# Patient Record
Sex: FEMALE | Race: WHITE | NOT HISPANIC OR LATINO | Employment: FULL TIME | URBAN - METROPOLITAN AREA
[De-identification: names, ages, dates, MRNs, and addresses within clinical notes are randomized per-mention and may not be internally consistent; named-entity substitution may affect disease eponyms.]

---

## 2019-12-18 ENCOUNTER — OFFICE VISIT (OUTPATIENT)
Dept: FAMILY MEDICINE | Facility: CLINIC | Age: 24
End: 2019-12-18
Payer: COMMERCIAL

## 2019-12-18 VITALS
HEIGHT: 67 IN | DIASTOLIC BLOOD PRESSURE: 78 MMHG | BODY MASS INDEX: 41.44 KG/M2 | WEIGHT: 264 LBS | RESPIRATION RATE: 16 BRPM | OXYGEN SATURATION: 100 % | TEMPERATURE: 98 F | HEART RATE: 91 BPM | SYSTOLIC BLOOD PRESSURE: 128 MMHG

## 2019-12-18 DIAGNOSIS — F40.243 FLYING PHOBIA: Primary | ICD-10-CM

## 2019-12-18 PROCEDURE — 99202 OFFICE O/P NEW SF 15 MIN: CPT | Performed by: FAMILY MEDICINE

## 2019-12-18 RX ORDER — ALPRAZOLAM 0.25 MG
0.25 TABLET ORAL 3 TIMES DAILY PRN
COMMUNITY
End: 2022-01-04

## 2019-12-18 RX ORDER — LORAZEPAM 0.5 MG/1
TABLET ORAL
Qty: 10 TABLET | Refills: 1 | Status: SHIPPED | OUTPATIENT
Start: 2019-12-18 | End: 2022-01-04

## 2019-12-18 ASSESSMENT — MIFFLIN-ST. JEOR: SCORE: 1967.19

## 2019-12-18 NOTE — PROGRESS NOTES
"Subjective     Melva Torres is a 24 year old female who presents to clinic today for the following health issues:    HPI   New Patient/Transfer of Care  Patient would like to discuss anxiety episodes that she gets only when flying the day before     Recently moved from [facility] on vaccines and pap - DELMI signed    New to [place]  Lots of travel in the  Last few years for work  Has used Xanax in the past helped some  Flying every other week  Has some anxiety outside of flying  Occurs with her cycle  Once a month has debilitating   Sister has possible bipolar        There is no problem list on file for this patient.    History reviewed. No pertinent surgical history.    Social History     Tobacco Use     Smoking status: Never Smoker     Smokeless tobacco: Never Used   Substance Use Topics     Alcohol use: Yes     Comment: occ.      Family History   Problem Relation Age of Onset     Allergies Mother      Eye Disorder Father      Diabetes Maternal Grandfather          Current Outpatient Medications   Medication Sig Dispense Refill     ALPRAZolam (XANAX) 0.25 MG tablet Take 0.25 mg by mouth 3 times daily as needed for anxiety       LORazepam (ATIVAN) 0.5 MG tablet Use 30 min before flights.  This is a 10 week supply 10 tablet 1       Reviewed and updated as needed this visit by Provider  Problems         Review of Systems   ROS COMP: Constitutional, HEENT, cardiovascular, pulmonary, gi and gu systems are negative, except as otherwise noted.      Objective    /78   Pulse 91   Temp 98  F (36.7  C) (Oral)   Resp 16   Ht 1.689 m (5' 6.5\")   Wt 119.7 kg (264 lb)   LMP 12/11/2019   SpO2 100%   BMI 41.97 kg/m    Body mass index is 41.97 kg/m .  Physical Exam   GENERAL: healthy, alert and no distress  PSYCH: mentation appears normal, affect normal/bright    Diagnostic Test Results:  Labs reviewed in Epic        Assessment & Plan     1. Flying phobia  Ok for small supply of meds for flying - she flies about every " "other seek 1 pill each way  If anxiety increases or worsens can use talk therapy or call me to discuss controller meds  - LORazepam (ATIVAN) 0.5 MG tablet; Use 30 min before flights.  This is a 10 week supply  Dispense: 10 tablet; Refill: 1  - MENTAL HEALTH REFERRAL  - Adult; Outpatient Treatment; Individual/Couples/Family/Group Therapy/Health Psychology; G: [facility] [phone]; We will contact you to schedule the appointment or please call with any questions     BMI:   Estimated body mass index is 41.97 kg/m  as calculated from the following:    Height as of this encounter: 1.689 m (5' 6.5\").    Weight as of this encounter: 119.7 kg (264 lb).           See Patient Instructions    No follow-ups on file.    Deidra Salazar MD  [facility]    "

## 2020-02-25 ENCOUNTER — TELEPHONE (OUTPATIENT)
Dept: FAMILY MEDICINE | Facility: CLINIC | Age: 25
End: 2020-02-25

## 2020-02-25 NOTE — TELEPHONE ENCOUNTER
See below MyChart conversation with pt     Patient has had low grade fever for ~1 wk  Has pain also to hip/lower back area  States if you were to put your hands on your hips - the pain is located with thumb would go on (R) side  Told pt worried about appendix  Pt states pain not necessarily where appendix is located    It's very sharp at times  Becoming more frequent  Not connected to eating, using bathroom, etc     No other symptoms (denies nausea, vomiting, dizziness)  Painful for just short periods of time     Couldn't move for a minute today during a meeting d/t severity of pain     Pain feels better when pt pushes on area  Denies pain with palpation    No recent injuries or falls  Told pt to keep appt as scheduled tomorrow but to go to ER if worsening or new s/s  Pt agrees with plan  Thi LE RN

## 2020-02-25 NOTE — TELEPHONE ENCOUNTER
Cater to u Appt from Patient:     ===View-only below this line===      ----- Message -----     From: Melva Brian     Sent: 2/25/2020  2:37 PM CST       To: Patient Appointment Schedule Request Message List  Subject: RE: Appointment scheduled from Cater to u    Hi marilin,     I've had a low fever for a couple days but no other symptoms of anything. Pain started Monday and is infrequent, but today it's gotten slightly worse and more frequent.     Melva Nunez  ----- Message -----  From: Marilin LE  Sent: 2/25/2020  2:33 PM CST  To: Melva Torres  Subject: RE: Appointment scheduled from Cater to u  Hi Melva,     Do you have other symptoms like nausea, vomiting, fever, dizziness, etc?    Marilin LE RN      ----- Message -----     From: Melva Torres     Sent: 2/25/2020  1:50 PM CST       To: Patient Appointment Schedule Request Message List  Subject: RE: Appointment scheduled from Cater to u    Appointment For: Melva Torres ([id number])  Visit Type: [facility] OFFICE VISIT SHORT (910)    2/26/2020    9:45 AM  15 mins.  Deidra Salazar MD [facility]    Patient Comments:  I am experiencing pain on my right side above my hip, it is sharp and inconsistent.

## 2020-02-26 ENCOUNTER — OFFICE VISIT (OUTPATIENT)
Dept: FAMILY MEDICINE | Facility: CLINIC | Age: 25
End: 2020-02-26
Payer: COMMERCIAL

## 2020-02-26 VITALS
WEIGHT: 264.3 LBS | OXYGEN SATURATION: 95 % | SYSTOLIC BLOOD PRESSURE: 130 MMHG | BODY MASS INDEX: 41.48 KG/M2 | DIASTOLIC BLOOD PRESSURE: 82 MMHG | HEIGHT: 67 IN | TEMPERATURE: 98.3 F | HEART RATE: 77 BPM

## 2020-02-26 DIAGNOSIS — M54.50 ACUTE RIGHT-SIDED LOW BACK PAIN WITHOUT SCIATICA: Primary | ICD-10-CM

## 2020-02-26 LAB
ALBUMIN UR-MCNC: NEGATIVE MG/DL
APPEARANCE UR: CLEAR
BILIRUB UR QL STRIP: NEGATIVE
COLOR UR AUTO: YELLOW
GLUCOSE UR STRIP-MCNC: NEGATIVE MG/DL
HGB UR QL STRIP: NEGATIVE
KETONES UR STRIP-MCNC: NEGATIVE MG/DL
LEUKOCYTE ESTERASE UR QL STRIP: ABNORMAL
MUCOUS THREADS #/AREA URNS LPF: PRESENT /LPF
NITRATE UR QL: NEGATIVE
NON-SQ EPI CELLS #/AREA URNS LPF: ABNORMAL /LPF
PH UR STRIP: 6.5 PH (ref 5–7)
RBC #/AREA URNS AUTO: ABNORMAL /HPF
SOURCE: ABNORMAL
SP GR UR STRIP: 1.02 (ref 1–1.03)
UROBILINOGEN UR STRIP-ACNC: 0.2 EU/DL (ref 0.2–1)
WBC #/AREA URNS AUTO: ABNORMAL /HPF

## 2020-02-26 PROCEDURE — 99214 OFFICE O/P EST MOD 30 MIN: CPT | Performed by: FAMILY MEDICINE

## 2020-02-26 PROCEDURE — 81001 URINALYSIS AUTO W/SCOPE: CPT | Performed by: FAMILY MEDICINE

## 2020-02-26 ASSESSMENT — MIFFLIN-ST. JEOR: SCORE: 1968.55

## 2020-02-26 NOTE — NURSING NOTE
"Chief Complaint   Patient presents with     Back Pain     /82   Pulse 77   Temp 98.3  F (36.8  C) (Oral)   Ht 1.689 m (5' 6.5\")   Wt 119.9 kg (264 lb 4.8 oz)   LMP 02/04/2020 (Approximate)   SpO2 95%   BMI 42.02 kg/m   Estimated body mass index is 42.02 kg/m  as calculated from the following:    Height as of this encounter: 1.689 m (5' 6.5\").    Weight as of this encounter: 119.9 kg (264 lb 4.8 oz).  bp completed using cuff size: large      Health Maintenance addressed:  NONE    n/a    Amber Hugo MA    "

## 2020-02-26 NOTE — PROGRESS NOTES
Subjective     Melva Torres is a 24 year old female who presents to clinic today for the following health issues:    HPI   Right side Pain      Duration: 02/24/20    Descripti/on (location/character/radiation): right side back        Associa/nancy flank pain: None    Intensity:  moderate    Accompanying signs and symptoms:        Fever/Chills: YES- low grade fever for 7 days       Gas/Bloating: YES (no pain)       Nausea/vomitting: no        Diarrhea: YES (not too abnormal, more bloated than normal)       Dysuria or Hematuria: no     History (previous similar pain/trauma/previous testing): none    Precipitating or alleviating factors:       Pain worse with eating/BM/urination: none       Pain relieved by BM: no     Therapies tried and outcome: hot rice bag, temporary relief    LMP:  ~2/4/20    Sharp pain in [place] area in R low back coming on/off.  Come on ~3-4 times Mon (2 days ago), 10 times on Tues (yest), usually lasting ~1 min.  Very painful, knife-like.  Goes to grab it, and pressure helps, but still hurts.  Feels like deep pain, like massage wouldn't help.  Usually happens when sitting, and sits there, movement doesn't help/hurt.  ~7/10 pain.    Mild GI sx's as noted above, otherwise feeling well.        There is no problem list on file for this patient.    History reviewed. No pertinent surgical history.    Social History     Tobacco Use     Smoking status: Never Smoker     Smokeless tobacco: Never Used   Substance Use Topics     Alcohol use: Yes     Comment: occ.      Family History   Problem Relation Age of Onset     Allergies Mother      Eye Disorder Father      Diabetes Maternal Grandfather          Current Outpatient Medications   Medication Sig Dispense Refill     ALPRAZolam (XANAX) 0.25 MG tablet Take 0.25 mg by mouth 3 times daily as needed for anxiety       LORazepam (ATIVAN) 0.5 MG tablet Use 30 min before flights.  This is a 10 week supply 10 tablet 1     No Known Allergies    Reviewed and updated  "as needed this visit by Provider  Tobacco  Allergies  Meds  Problems  Med Hx  Surg Hx  Fam Hx         Review of Systems   ROS COMP: CONSTITUTIONAL:POSITIVE  for fever just at night the last week of so and NEGATIVE  for chills, malaise and sweats  INTEGUMENTARY/SKIN: NEGATIVE for worrisome rashes, moles or lesions  EYES: NEGATIVE for vision changes or irritation  ENT/MOUTH: NEGATIVE for ear, mouth and throat problems  RESP: NEGATIVE for significant cough or SOB  BREAST: NEGATIVE for masses, tenderness or discharge  CV: NEGATIVE for chest pain, palpitations or peripheral edema  GI: NEGATIVE for nausea, abdominal pain or constipation, POSITIVE for bloating and diarrhea after eating pizza and beer over the weekend  : NEGATIVE for frequency, dysuria, hematuria or cloudy urine  MUSCULOSKELETAL: NEGATIVE for significant arthralgias or myalgia  MUSCULOSKELETAL:POSITIVE  for right lower back pain and NEGATIVE for arthralgias, myalgia and paresthesias  NEURO: NEGATIVE for weakness, dizziness or paresthesias  ENDOCRINE: NEGATIVE for temperature intolerance, skin/hair changes  HEME: NEGATIVE for bleeding problems  PSYCHIATRIC: NEGATIVE for changes in mood or affect      Objective    /82   Pulse 77   Temp 98.3  F (36.8  C) (Oral)   Ht 1.689 m (5' 6.5\")   Wt 119.9 kg (264 lb 4.8 oz)   LMP 02/04/2020 (Approximate)   SpO2 95%   BMI 42.02 kg/m    Body mass index is 42.02 kg/m .  Physical Exam   GENERAL: healthy, alert and no distress  EYES: Eyes grossly normal to inspection, conjunctivae and sclerae normal  HENT: ear canals and TM's normal, nose and mouth without ulcers or lesions  NECK: no adenopathy, no asymmetry, masses, or scars   RESP: lungs clear to auscultation - no rales, rhonchi or wheezes  CV: regular rate and rhythm, normal S1 S2, no S3 or S4, no murmur, click or rub  ABDOMEN: tenderness RLQ and R lowback pain on palpation, no organomegaly or masses and bowel sounds normal  MS: no gross " musculoskeletal defects noted, no edema  SKIN: no suspicious lesions or rashes  NEURO: Normal strength and tone, mentation intact and speech normal  PSYCH: mentation appears normal, affect normal/bright    Diagnostic Test Results:  Urinalysis - negative        Assessment & Plan       ICD-10-CM    1. Acute right-sided low back pain without sciatica M54.5 UA with Microscopic reflex to Culture     Unsure etiology.  UA normal, so unlikely pyelonephritis, UTI or kidney stones.  Rec use heat and ice and stretching  Monitor and RTC if not going away or worsening.      Socorro Barragan Nurse Practitioner Student  The nurse practitioner student has acted as my scribe.  I have completed all components of the history, physical exam and assessment and plan and agree with the note as documented.      Luz Maria Chiang MD  [facility]

## 2020-03-11 ENCOUNTER — HEALTH MAINTENANCE LETTER (OUTPATIENT)
Age: 25
End: 2020-03-11

## 2021-01-04 ENCOUNTER — HEALTH MAINTENANCE LETTER (OUTPATIENT)
Age: 26
End: 2021-01-04

## 2021-02-04 ENCOUNTER — TELEPHONE (OUTPATIENT)
Dept: FAMILY MEDICINE | Facility: CLINIC | Age: 26
End: 2021-02-04

## 2021-02-04 NOTE — TELEPHONE ENCOUNTER
Patient Quality Outreach      Summary:    Patient has the following on her problem list/HM: None    Patient is due/failing the following:   Cervical Cancer Screening - PAP Needed and Adult/Adolescent physical, date due: 1994    Type of outreach:    Sent Intalio message.    Questions for provider review:    None                                                                                                                                     Aye Quinonez MA on 2/4/2021 at 11:15 AM       Chart routed to .

## 2021-03-02 ENCOUNTER — OFFICE VISIT (OUTPATIENT)
Dept: FAMILY MEDICINE | Facility: CLINIC | Age: 26
End: 2021-03-02
Payer: COMMERCIAL

## 2021-03-02 VITALS
DIASTOLIC BLOOD PRESSURE: 85 MMHG | SYSTOLIC BLOOD PRESSURE: 126 MMHG | WEIGHT: 264.8 LBS | BODY MASS INDEX: 42.56 KG/M2 | HEART RATE: 101 BPM | HEIGHT: 66 IN | OXYGEN SATURATION: 95 %

## 2021-03-02 DIAGNOSIS — N94.10 DYSPAREUNIA IN FEMALE: ICD-10-CM

## 2021-03-02 DIAGNOSIS — Z00.00 ROUTINE GENERAL MEDICAL EXAMINATION AT A HEALTH CARE FACILITY: Primary | ICD-10-CM

## 2021-03-02 LAB
SPECIMEN SOURCE: NORMAL
WET PREP SPEC: NORMAL

## 2021-03-02 PROCEDURE — 87210 SMEAR WET MOUNT SALINE/INK: CPT | Performed by: FAMILY MEDICINE

## 2021-03-02 PROCEDURE — 87491 CHLMYD TRACH DNA AMP PROBE: CPT | Performed by: FAMILY MEDICINE

## 2021-03-02 PROCEDURE — G0145 SCR C/V CYTO,THINLAYER,RESCR: HCPCS | Performed by: FAMILY MEDICINE

## 2021-03-02 PROCEDURE — 87591 N.GONORRHOEAE DNA AMP PROB: CPT | Performed by: FAMILY MEDICINE

## 2021-03-02 PROCEDURE — 99395 PREV VISIT EST AGE 18-39: CPT | Performed by: FAMILY MEDICINE

## 2021-03-02 SDOH — ECONOMIC STABILITY: INCOME INSECURITY: HOW HARD IS IT FOR YOU TO PAY FOR THE VERY BASICS LIKE FOOD, HOUSING, MEDICAL CARE, AND HEATING?: NOT ASKED

## 2021-03-02 SDOH — ECONOMIC STABILITY: FOOD INSECURITY: WITHIN THE PAST 12 MONTHS, THE FOOD YOU BOUGHT JUST DIDN'T LAST AND YOU DIDN'T HAVE MONEY TO GET MORE.: NOT ASKED

## 2021-03-02 SDOH — ECONOMIC STABILITY: FOOD INSECURITY: WITHIN THE PAST 12 MONTHS, YOU WORRIED THAT YOUR FOOD WOULD RUN OUT BEFORE YOU GOT MONEY TO BUY MORE.: NOT ASKED

## 2021-03-02 SDOH — ECONOMIC STABILITY: TRANSPORTATION INSECURITY
IN THE PAST 12 MONTHS, HAS THE LACK OF TRANSPORTATION KEPT YOU FROM MEDICAL APPOINTMENTS OR FROM GETTING MEDICATIONS?: NOT ASKED

## 2021-03-02 SDOH — ECONOMIC STABILITY: TRANSPORTATION INSECURITY
IN THE PAST 12 MONTHS, HAS LACK OF TRANSPORTATION KEPT YOU FROM MEETINGS, WORK, OR FROM GETTING THINGS NEEDED FOR DAILY LIVING?: NOT ASKED

## 2021-03-02 ASSESSMENT — MIFFLIN-ST. JEOR: SCORE: 1961.84

## 2021-03-02 NOTE — PROGRESS NOTES
SUBJECTIVE:   CC: Melva Torres is an 25 year old woman who presents for preventive health visit.       Patient has been advised of split billing requirements and indicates understanding: Yes  Healthy Habits:     Getting at least 3 servings of Calcium per day:  Yes    Bi-annual eye exam:  Yes    Dental care twice a year:  Yes    Sleep apnea or symptoms of sleep apnea:  None    Diet:  Regular (no restrictions)    Frequency of exercise:  2-3 days/week    Duration of exercise:  15-30 minutes    Taking medications regularly:  Not Applicable    Medication side effects:  Not applicable    PHQ-2 Total Score: 0    Additional concerns today:  Yes    Major issue is pain with sex.  Was fine, but started worsening ~3 yrs ago and now really not able to have penetrative intercourse.  Same partner (engaged) x 5 yrs- first couple years she had no symptoms with sex.  Gradually worsened over the last few yrs.  Now, he can't penetrate even 1-2 inches, hurts her too much.  He's very supportive, and they have tried a lot of different things to help.  Good relationship.  They'd like to have kids together, so wanted to try and address the issue.  Wanted to rule out potential issues.  Sx's- Every time with intercourse, gets 'intense stinging, hot poker feeling'.  Pain in areas of walls, inside opening and whole vaginal area.  Not deep pain.  Just feels like the whole vaginal area is too small.  She's tried tampons- mildly uncomfortable, but okay.  Tried dildo, ~1in, hurts as well.  Uses lubrication, foreplay, trying to relax.    Did pelvic ultrasound ~3 yrs ago in [place].  Found cysts (after painful period).  Normal otherwise.    Vaccines-   Would do flu shot today.  Thinks she did the HPV vaccine- 2 of 3 doses.  Thinks she's had the tdap in the last five years- will check.      Today's PHQ-2 Score:   PHQ-2 ( 1999 Pfizer) 3/2/2021   Q1: Little interest or pleasure in doing things 0   Q2: Feeling down, depressed or hopeless 0   PHQ-2  Score 0   Q1: Little interest or pleasure in doing things Not at all   Q2: Feeling down, depressed or hopeless Not at all   PHQ-2 Score 0       Abuse: Current or Past (Physical, Sexual or Emotional) - No  Do you feel safe in your environment? Yes    Have you ever done Advance Care Planning? (For example, a Health Directive, POLST, or a discussion with a medical provider or your loved ones about your wishes): No, advance care planning information given to patient to review.  Patient declined advance care planning discussion at this time.    Social History     Tobacco Use     Smoking status: Never Smoker     Smokeless tobacco: Never Used   Substance Use Topics     Alcohol use: Yes     Comment: occ.      If you drink alcohol do you typically have >3 drinks per day or >7 drinks per week? No    Alcohol Use 3/2/2021   Prescreen: >3 drinks/day or >7 drinks/week? No   No flowsheet data found.    Any new diagnosis of family breast, ovarian, or bowel cancer? No     Reviewed orders with patient.  Reviewed health maintenance and updated orders accordingly - Yes      Lab work is in process  Labs reviewed in EPIC  BP Readings from Last 3 Encounters:   03/02/21 126/85   02/26/20 130/82   12/18/19 128/78    Wt Readings from Last 3 Encounters:   03/02/21 120.1 kg (264 lb 12.8 oz)   02/26/20 119.9 kg (264 lb 4.8 oz)   12/18/19 119.7 kg (264 lb)                  There is no problem list on file for this patient.    History reviewed. No pertinent surgical history.    Social History     Tobacco Use     Smoking status: Never Smoker     Smokeless tobacco: Never Used   Substance Use Topics     Alcohol use: Yes     Comment: occ.      Family History   Problem Relation Age of Onset     Allergies Mother      Eye Disorder Father      Diabetes Maternal Grandfather          Current Outpatient Medications   Medication Sig Dispense Refill     ALPRAZolam (XANAX) 0.25 MG tablet Take 0.25 mg by mouth 3 times daily as needed for anxiety       LORazepam  "(ATIVAN) 0.5 MG tablet Use 30 min before flights.  This is a 10 week supply (Patient not taking: Reported on 3/2/2021) 10 tablet 1     No Known Allergies  No lab results found.     Breast CA Risk Screening:    Patient under 40 years of age: Routine Mammogram Screening not recommended.   Pertinent mammograms are reviewed under the imaging tab.    History of abnormal Pap smear: NO - age 21-29 PAP every 3 years recommended     Reviewed and updated as needed this visit by clinical staff  Tobacco  Allergies  Meds  Problems  Med Hx  Surg Hx  Fam Hx          Reviewed and updated as needed this visit by Provider                    Review of Systems  10 point ROS of systems including Constitutional, Eyes, Respiratory, Cardiovascular, Gastroenterology, Genitourinary, Integumentary, Muscularskeletal, Psychiatric were all negative except for pertinent positives noted in my HPI.       OBJECTIVE:   /85   Pulse 101   Ht 1.683 m (5' 6.25\")   Wt 120.1 kg (264 lb 12.8 oz)   LMP 02/02/2021 (Exact Date)   SpO2 95%   Breastfeeding No   BMI 42.42 kg/m    Physical Exam  GENERAL: healthy, alert and no distress  EYES: Eyes grossly normal to inspection, PERRL and conjunctivae and sclerae normal  HENT: ear canals and TM's normal, nose and mouth without ulcers or lesions  NECK: no adenopathy, no asymmetry, masses, or scars and thyroid normal to palpation  RESP: lungs clear to auscultation - no rales, rhonchi or wheezes  BREAST: normal without masses, tenderness or nipple discharge and no palpable axillary masses or adenopathy  CV: regular rate and rhythm, normal S1 S2, no S3 or S4, no murmur, click or rub, no peripheral edema and peripheral pulses strong  ABDOMEN: soft, nontender, no hepatosplenomegaly, no masses and bowel sounds normal   (female): normal female external genitalia, normal urethral meatus, vaginal mucosa pink, moist, well rugated, and normal cervix/adnexa/uterus without masses or discharge  MS: no gross " musculoskeletal defects noted, no edema  SKIN: no suspicious lesions or rashes  NEURO: Normal strength and tone, mentation intact and speech normal  PSYCH: mentation appears normal, affect normal/bright    Diagnostic Test Results:  Labs reviewed in Epic  Results for orders placed or performed in visit on 03/02/21   NEISSERIA GONORRHOEA PCR     Status: None    Specimen: Vagina   Result Value Ref Range    Specimen Descrip Vagina     N Gonorrhea PCR Negative NEG^Negative   CHLAMYDIA TRACHOMATIS PCR     Status: None    Specimen: Vagina   Result Value Ref Range    Specimen Description Vagina     Chlamydia Trachomatis PCR Negative NEG^Negative   Wet prep     Status: None    Specimen: Vagina   Result Value Ref Range    Specimen Description Vagina     Wet Prep No Trichomonas seen     Wet Prep No yeast seen     Wet Prep No clue cells seen     Wet Prep WBC'S seen  Few          ASSESSMENT/PLAN:       ICD-10-CM    1. Routine general medical examination at a health care facility  Z00.00 Pap imaged thin layer screen reflex to HPV if ASCUS - recommend age 25 - 29     [facility], HAND, AND CHIROPRACTIC REFERRAL     CANCELED: INFLUENZA VACCINE IM > 6 MONTHS VALENT IIV4 [76848]   2. Dyspareunia in female  N94.10 NEISSERIA GONORRHOEA PCR     CHLAMYDIA TRACHOMATIS PCR     Wet prep     CPE- Discussed diet, calcium and exercise.  Thin prep pap was done.  Eye and dental care UTD or recommended f/u.  Flu vaccine immunizations needed today, but pt had to leave prior to getting vaccine.  See orders below for tests ordered and screening needed.      Dyspareunia- Same partner x 5 yrs, started having pain with intercourse (insertional, stretching/friction-like pain) ~3yrs ago, and continued to worsen, now not able to have any insertional intercourse.  Pain with dilator, minimal pain with tampon.  Speculum exam with regular speculum tolerated but mildly painful.    Wet prep negative. GC/Chlam pending, pap pending.  Will send on to PT for pelvic  "floor/dyspareunia eval/txt.  If not helping, rec referring on to ob/gyn for their thoughts.      Patient has been advised of split billing requirements and indicates understanding: Yes     COUNSELING:  Reviewed preventive health counseling, as reflected in patient instructions    Estimated body mass index is 42.02 kg/m  as calculated from the following:    Height as of 2/26/20: 1.689 m (5' 6.5\").    Weight as of 2/26/20: 119.9 kg (264 lb 4.8 oz).    Weight management plan: Discussed healthy diet and exercise guidelines    She reports that she has never smoked. She has never used smokeless tobacco.      Counseling Resources:  ATP IV Guidelines  Pooled Cohorts Equation Calculator  Breast Cancer Risk Calculator  BRCA-Related Cancer Risk Assessment: FHS-7 Tool  FRAX Risk Assessment  ICSI Preventive Guidelines  Dietary Guidelines for Americans, 2010  USDA's MyPlate  ASA Prophylaxis  Lung CA Screening    Luz Maria Chiang MD  [facility]  "

## 2021-03-03 LAB
C TRACH DNA SPEC QL NAA+PROBE: NEGATIVE
N GONORRHOEA DNA SPEC QL NAA+PROBE: NEGATIVE
SPECIMEN SOURCE: NORMAL
SPECIMEN SOURCE: NORMAL

## 2021-03-04 NOTE — RESULT ENCOUNTER NOTE
Here are your lab results from your recent visit...  -Your STD labs (gonorrhea, chlamydia) were both negative.  -Your wet prep was negative for trichomonas, yeast and bacterial vaginosis (clue cells).    Please let me know if you have any questions.  Best,   Yoni Chiang MD

## 2021-03-05 LAB
COPATH REPORT: NORMAL
PAP: NORMAL

## 2021-03-26 ENCOUNTER — THERAPY VISIT (OUTPATIENT)
Dept: PHYSICAL THERAPY | Facility: CLINIC | Age: 26
End: 2021-03-26
Attending: FAMILY MEDICINE
Payer: COMMERCIAL

## 2021-03-26 DIAGNOSIS — Z00.00 ROUTINE GENERAL MEDICAL EXAMINATION AT A HEALTH CARE FACILITY: ICD-10-CM

## 2021-03-26 DIAGNOSIS — M99.05 SOMATIC DYSFUNCTION OF PELVIC REGION: Primary | ICD-10-CM

## 2021-03-26 PROCEDURE — 97530 THERAPEUTIC ACTIVITIES: CPT | Mod: GP | Performed by: PHYSICAL THERAPIST

## 2021-03-26 PROCEDURE — 97161 PT EVAL LOW COMPLEX 20 MIN: CPT | Mod: GP | Performed by: PHYSICAL THERAPIST

## 2021-03-26 PROCEDURE — 97110 THERAPEUTIC EXERCISES: CPT | Mod: GP | Performed by: PHYSICAL THERAPIST

## 2021-03-26 PROCEDURE — 97112 NEUROMUSCULAR REEDUCATION: CPT | Mod: GP | Performed by: PHYSICAL THERAPIST

## 2021-03-26 NOTE — PROGRESS NOTES
Physical Therapy Initial Evaluation  Subjective:  The history is provided by the patient. No  was used.   Therapist Generated HPI Evaluation  Problem details: HPI: 3-4 years ago onset of painful intercourse. Has supportive partner, but pain has gotten worse. Up to 9/10, has gotten progressively worse. Does not feel stress is a factor    Birth History: no pregnancies    Bladder symptoms:  denies  Fluid intake: 8 cups water, 2 cups coffee    Bowel symptoms  Leaking: denies  Constipation: denies  Regular? 3x/day    [place]:  Difficulty with speculum, able to tolerate tampon, finger, no concerns with external stimulation    Prolapse symptoms: denies    Pain: used to have painful periods, did find cysts, not on meds but periods are no longer painful    PMH/PSH: ovarian cysts, no medications    Exercise: previously enjoyed HIIT, spin class, benji; walking/hiking, yoga    Occupation: , lots of sitting    Goals: decrease pain, able to have children.         Type of problem:  Pelvic dysfunction (dyspareunia).      Condition occurred with:  Insidious onset.  Where condition occurred: for unknown reasons.  Patient reports pain:  Vaginal and vulvar vestibule.  Pain is described as burning and is intermittent.    Since onset symptoms are gradually worsening.  Symptoms are exacerbated by intercourse        Restrictions due to condition include:  Working in normal job without restrictions.  Home/work barriers: intercourse-wanting to have kids.                        Objective:  System                                 Pelvic Dysfunction Evaluation:            Pelvic Clock Exam:    Ischiocavernosis pain:  ++  Bulbocavernosis pain:  ++  Transverse Perineal:  ++          External Assessment:    Skin Condition:  Normal    Bearing Down/Coughing:  Normal      Muscle Contraction/Perineal Mobility:  Elevation and urogential triangle descent  Internal Assessment:    Sensory Exam:   Normal  Contraction/Grade:  Weak squeeze, 2 second hold (2)          SEMG Biofeedback:        Suraface electrode placement--Perianal:  B Levator ani  Baseline EMG PM:  2mV        EMG interpretation to fatigue:  3-5 seconds  Position:  SupineAdditional History:    Number of Pregnancies: 0    Caffeine Consumption:  2 cups/day                     General     ROS    Assessment/Plan:    Patient is a 26 year old female with pelvic complaints.    Patient has the following significant findings with corresponding treatment plan.                Diagnosis 1:  Somatic dysfunction of pelvis  Pain -  manual therapy, self management, education and home program  Impaired muscle performance - neuro re-education and home program  Decreased function - therapeutic activities and home program    Therapy Evaluation Codes:   1) History comprised of:   Personal factors that impact the plan of care:      None.    Comorbidity factors that impact the plan of care are:      Overweight.     Medications impacting care: None.  2) Examination of Body Systems comprised of:   Body structures and functions that impact the plan of care:      Pelvis.   Activity limitations that impact the plan of care are:      Pelvic Exam and [place].  3) Clinical presentation characteristics are:   Stable/Uncomplicated.  4) Decision-Making    Low complexity using standardized patient assessment instrument and/or measureable assessment of functional outcome.  Cumulative Therapy Evaluation is: Low complexity.    Previous and current functional limitations:  (See Goal Flow Sheet for this information)    Short term and Long term goals: (See Goal Flow Sheet for this information)     Communication ability:  Patient appears to be able to clearly communicate and understand verbal and written communication and follow directions correctly.  Treatment Explanation - The following has been discussed with the patient:   RX ordered/plan of care  Anticipated outcomes  Possible  risks and side effects  This patient would benefit from PT intervention to resume normal activities.   Rehab potential is good.    Frequency:  2 X a month, once daily  Duration:  for 3 months  Discharge Plan:  Achieve all LTG.  Independent in home treatment program.  Reach maximal therapeutic benefit.      Please refer to the daily flowsheet for treatment today, total treatment time and time spent performing 1:1 timed codes.

## 2021-03-29 NOTE — PROGRESS NOTES
Physical Therapy Initial Evaluation  Subjective:    Patient Health History           General health as reported by patient is excellent.  Pertinent medical history includes: overweight.   Red flags:  None as reported by patient.  Medical allergies: none.   Surgeries include:  None.    Current medications:  None.    Current occupation is .   Primary job tasks include:  Computer work and prolonged sitting.                                    Objective:  System    Physical Exam    General     ROS    Assessment/Plan:

## 2021-04-02 ENCOUNTER — THERAPY VISIT (OUTPATIENT)
Dept: PHYSICAL THERAPY | Facility: CLINIC | Age: 26
End: 2021-04-02
Payer: COMMERCIAL

## 2021-04-02 DIAGNOSIS — M99.05 SOMATIC DYSFUNCTION OF PELVIC REGION: Primary | ICD-10-CM

## 2021-04-02 PROCEDURE — 97530 THERAPEUTIC ACTIVITIES: CPT | Mod: GP | Performed by: PHYSICAL THERAPIST

## 2021-04-02 PROCEDURE — 97140 MANUAL THERAPY 1/> REGIONS: CPT | Mod: GP | Performed by: PHYSICAL THERAPIST

## 2021-05-03 ENCOUNTER — THERAPY VISIT (OUTPATIENT)
Dept: PHYSICAL THERAPY | Facility: CLINIC | Age: 26
End: 2021-05-03
Payer: COMMERCIAL

## 2021-05-03 DIAGNOSIS — M99.05 SOMATIC DYSFUNCTION OF PELVIC REGION: Primary | ICD-10-CM

## 2021-05-03 PROCEDURE — 97110 THERAPEUTIC EXERCISES: CPT | Mod: GP | Performed by: PHYSICAL THERAPIST

## 2021-05-03 PROCEDURE — 97140 MANUAL THERAPY 1/> REGIONS: CPT | Mod: GP | Performed by: PHYSICAL THERAPIST

## 2021-05-03 NOTE — PROGRESS NOTES
Subjective:  HPI  Physical Exam                    Objective:  System    Physical Exam    General     ROS    Assessment/Plan:    DISCHARGE REPORT    Progress reporting period is from 4/2/21 to 5/3/21.       SUBJECTIVE  Subjective changes noted by patient:  .  Subjective: Using dilators, penetrative intercourse went well. Pt feels good about progress and is comfortable discharging today, as she feels she has the tools to progress on her own    Current pain level is 4/10 Current Pain level: 4/10(with intercourse).     Previous pain level was  9/10  .   Changes in function:  Yes (See Goal flowsheet attached for changes in current functional level)  Adverse reaction to treatment or activity: None    OBJECTIVE  Changes noted in objective findings:  Yes,   Objective: informed consent and no adverse response to MT. Issued handout for intercourse positions. Discussed adding in PFM strengthening with an emphasis on relaxation after strengthening.     ASSESSMENT/PLAN  Updated problem list and treatment plan: Diagnosis 1:  Somatic dysfunction of pelvis region Pain -  manual therapy, self management, education and home program  Impaired muscle performance - neuro re-education and home program  Decreased function - therapeutic activities and home program  STG/LTGs have been met or progress has been made towards goals:  Yes (See Goal flow sheet completed today.)  Assessment of Progress: The patient's condition is improving.  Self Management Plans:  Patient is independent in a home treatment program.  I have re-evaluated this patient and find that the nature, scope, duration and intensity of the therapy is appropriate for the medical condition of the patient.  Melva continues to require the following intervention to meet STG and LTG's:  PT intervention is no longer required to meet STG/LTG.    Recommendations:  This patient is ready to be discharged from therapy and continue their home treatment program.    Please refer to the  daily flowsheet for treatment today, total treatment time and time spent performing 1:1 timed codes.

## 2021-08-28 ASSESSMENT — ANXIETY QUESTIONNAIRES
5. BEING SO RESTLESS THAT IT IS HARD TO SIT STILL: SEVERAL DAYS
3. WORRYING TOO MUCH ABOUT DIFFERENT THINGS: SEVERAL DAYS
6. BECOMING EASILY ANNOYED OR IRRITABLE: NEARLY EVERY DAY
7. FEELING AFRAID AS IF SOMETHING AWFUL MIGHT HAPPEN: SEVERAL DAYS
8. IF YOU CHECKED OFF ANY PROBLEMS, HOW DIFFICULT HAVE THESE MADE IT FOR YOU TO DO YOUR WORK, TAKE CARE OF THINGS AT HOME, OR GET ALONG WITH OTHER PEOPLE?: SOMEWHAT DIFFICULT
GAD7 TOTAL SCORE: 9
7. FEELING AFRAID AS IF SOMETHING AWFUL MIGHT HAPPEN: SEVERAL DAYS
4. TROUBLE RELAXING: SEVERAL DAYS
1. FEELING NERVOUS, ANXIOUS, OR ON EDGE: SEVERAL DAYS
2. NOT BEING ABLE TO STOP OR CONTROL WORRYING: SEVERAL DAYS
GAD7 TOTAL SCORE: 9
GAD7 TOTAL SCORE: 9

## 2021-08-28 ASSESSMENT — PATIENT HEALTH QUESTIONNAIRE - PHQ9
SUM OF ALL RESPONSES TO PHQ QUESTIONS 1-9: 9
10. IF YOU CHECKED OFF ANY PROBLEMS, HOW DIFFICULT HAVE THESE PROBLEMS MADE IT FOR YOU TO DO YOUR WORK, TAKE CARE OF THINGS AT HOME, OR GET ALONG WITH OTHER PEOPLE: SOMEWHAT DIFFICULT
SUM OF ALL RESPONSES TO PHQ QUESTIONS 1-9: 9

## 2021-08-29 ASSESSMENT — ANXIETY QUESTIONNAIRES: GAD7 TOTAL SCORE: 9

## 2021-08-29 ASSESSMENT — PATIENT HEALTH QUESTIONNAIRE - PHQ9: SUM OF ALL RESPONSES TO PHQ QUESTIONS 1-9: 9

## 2021-08-30 ENCOUNTER — HOSPITAL ENCOUNTER (OUTPATIENT)
Dept: BEHAVIORAL HEALTH | Facility: CLINIC | Age: 26
End: 2021-08-30
Attending: FAMILY MEDICINE
Payer: COMMERCIAL

## 2021-08-30 PROCEDURE — 999N000216 HC STATISTIC ADULT CD FACE TO FACE-NO CHRG: Mod: 95 | Performed by: COUNSELOR

## 2021-08-30 ASSESSMENT — COLUMBIA-SUICIDE SEVERITY RATING SCALE - C-SSRS
ATTEMPT LIFETIME: NO
6. HAVE YOU EVER DONE ANYTHING, STARTED TO DO ANYTHING, OR PREPARED TO DO ANYTHING TO END YOUR LIFE?: NO
2. HAVE YOU ACTUALLY HAD ANY THOUGHTS OF KILLING YOURSELF LIFETIME?: NO
3. HAVE YOU BEEN THINKING ABOUT HOW YOU MIGHT KILL YOURSELF?: NO
TOTAL  NUMBER OF INTERRUPTED ATTEMPTS PAST 3 MONTHS: NO
2. HAVE YOU ACTUALLY HAD ANY THOUGHTS OF KILLING YOURSELF?: NO
1. IN THE PAST MONTH, HAVE YOU WISHED YOU WERE DEAD OR WISHED YOU COULD GO TO SLEEP AND NOT WAKE UP?: NO
TOTAL  NUMBER OF INTERRUPTED ATTEMPTS LIFETIME: NO
4. HAVE YOU HAD THESE THOUGHTS AND HAD SOME INTENTION OF ACTING ON THEM?: NO
6. HAVE YOU EVER DONE ANYTHING, STARTED TO DO ANYTHING, OR PREPARED TO DO ANYTHING TO END YOUR LIFE?: NO
ATTEMPT PAST THREE MONTHS: NO
5. HAVE YOU STARTED TO WORK OUT OR WORKED OUT THE DETAILS OF HOW TO KILL YOURSELF? DO YOU INTEND TO CARRY OUT THIS PLAN?: NO
4. HAVE YOU HAD THESE THOUGHTS AND HAD SOME INTENTION OF ACTING ON THEM?: NO
TOTAL  NUMBER OF ABORTED OR SELF INTERRUPTED ATTEMPTS PAST LIFETIME: NO
TOTAL  NUMBER OF ABORTED OR SELF INTERRUPTED ATTEMPTS PAST 3 MONTHS: NO
5. HAVE YOU STARTED TO WORK OUT OR WORKED OUT THE DETAILS OF HOW TO KILL YOURSELF? DO YOU INTEND TO CARRY OUT THIS PLAN?: NO
1. IN THE PAST MONTH, HAVE YOU WISHED YOU WERE DEAD OR WISHED YOU COULD GO TO SLEEP AND NOT WAKE UP?: NO

## 2021-08-30 NOTE — PROGRESS NOTES
"[facility]    ADULT Mental Health Assessment Appointment Note    PATIENT'S NAME:  Melva Torres    MRN: [id number]  YOB: 1995  Address:  [address] [place] [address]  PREFERRED PHONE: [phone]  May we leave a referral related message: Yes    DATE OF SERVICE: 21  START TIME: 800  END TIME: 830  SERVICE MODALITY:  Video Visit:      Provider verified identity through the following two step process.  Patient provided:  Patient  and Patient address    Telemedicine Visit: The patient's condition can be safely assessed and treated via synchronous audio and visual telemedicine encounter.      Reason for Telemedicine Visit: Services only offered telehealth    Originating Site (Patient Location): Patient's home    Distant Site (Provider Location): Provider Remote Setting- Home Office    Consent:  The patient/guardian has verbally consented to: the potential risks and benefits of telemedicine (video visit) versus in person care; bill my insurance or make self-payment for services provided; and responsibility for payment of non-covered services.     Patient would like the video invitation sent by:  My Chart    Mode of Communication:  Video Conference via [employer]    As the provider I attest to compliance with applicable laws and regulations related to telemedicine.    Identifying Information:  Patient is a 26 year old, .  Patient was referred for an assessment by self.  Patient attended the session alone. Patient identified their preferred language to be English. Patient reported they does not need the assistance of an  or other support involved in therapy.     Services Requested:   Chief Complaint:   The reason for seeking services at this time is: \"I would like medication or something just to help me cope with the extra bad days. Ideally id like to stop crying at work.\".    The problem(s) began 21.Patient has not " attempted to resolve these concerns in the past.  Patient does not have legal concerns.      Mental Health:  Review of Symptoms per patient report:  Depression: No symptoms, Lack of interest, Change in energy level, Change in appetite, Ruminations, Irritability, Feeling sad, down, or depressed, Frequent crying and Anger outbursts  Mary:  No Symptoms  Psychosis: No Symptoms  Anxiety: Excessive worry, Nervousness, Physical complaints, such as headaches, stomachaches, muscle tension, Ruminations, Irritability and Anger outbursts  Panic:  Palpitations, Shortness of breath and Sense of impending doom     Post Traumatic Stress Disorder:  No Symptoms   Eating Disorder: No Symptoms  ADD / ADHD:  No symptoms  Conduct Disorder: No symptoms  Autism Spectrum Disorder: No symptoms  Obsessive Compulsive Disorder: No Symptoms    Patient reports the following compulsive behaviors and treatment history: Patient denies.      Substance Use:  Do you  have a history of alcohol or illicit drug use? Patient denies.      Significant Losses / Trauma / Abuse / Neglect Issues:   Patient did not serve in the .  There are indications or report of significant loss, trauma, abuse or neglect issues related to: are no indications and client denies any losses, trauma, abuse, or neglect concerns.  Concerns for possible neglect are not present.         Personal and Family Medical History:  Patient does not report a family history of mental health concerns.  Patient reports family history includes Allergies in her mother; Diabetes in her maternal grandfather; Eye Disorder in her father..     Patient does not report Mental Health Diagnosis or Treatment.    ent Plan    Current Mental Status Exam:   Appearance:  Appropriate    Eye Contact:  Good   Psychomotor:  Normal   Attitude / Demeanor: Cooperative   Speech Rate:  Normal/ Responsive  Volume:  Normal  volume  Language:  intact  Mood:   Normal  Affect:   Appropriate    Thought Content: Clear    Thought Process: Logical     Associations:  No loosening of associations  Insight:   Good   Judgment:  Intact   Orientation:  All  Attention:  Good    Rating Scales:  PHQ9:    PHQ-9 SCORE 8/28/2021   PHQ-9 Total Score MyChart 9 (Mild depression)   PHQ-9 Total Score 9   ;    GAD7:    ANN-7 SCORE 8/28/2021   Total Score 9 (mild anxiety)   Total Score 9       Safety Assessment:   Current Safety Concerns:  [place] Suicide Severity Rating Scale (Lifetime/Recent)  [place] Suicide Severity Rating (Lifetime/Recent) 8/30/2021   1. Wish to be Dead (Lifetime) No   1. Wish to be Dead (Recent) No   2. Non-Specific Active Suicidal Thoughts (Lifetime) No   2. Non-Specific Active Suicidal Thoughts (Recent) No   3. Active Suicidal Ideation with any Methods (Not Plan) Without Intent to Act (Lifetime) No   3. Active Suicidal Ideation with any Methods (Not Plan) Without Intent to Act (Recent) No   4. Active Suicidal Ideation with Some Intent to Act, Without Specific Plan (Lifetime) No   4. Active Suicidal Ideation with Some Intent to Act, Without Specific Plan (Recent) No   5. Active Suicidal Ideation with Specific Plan and Intent (Lifetime) No   5. Active Suicidal Ideation with Specific Plan and Intent (Recent) No   Actual Attempt (Lifetime) No   Actual Attempt (Past 3 Months) No   Has subject engaged in non-suicidal self-injurious behavior? (Lifetime) No   Has subject engaged in non-suicidal self-injurious behavior? (Past 3 Months) No   Interrupted Attempts (Lifetime) No   Interrupted Attempts (Past 3 Months) No   Aborted or Self-Interrupted Attempt (Lifetime) No   Aborted or Self-Interrupted Attempt (Past 3 Months) No   Preparatory Acts or Behavior (Lifetime) No   Preparatory Acts or Behavior (Past 3 Months) No     Patient denies current homicidal ideation and behaviors.  Patient denies current self-injurious ideation and behaviors.    Patient denied risk behaviors associated with substance use.  Patient denies any high risk  behaviors associated with mental health symptoms.  Patient reports the following current concerns for their personal safety: None.  Patient reports there are not  firearms in the house. Patient denied having firearms.       Therapeutic Interventions Provided: supportive active listening, provided Psychoeducational support and emotional validation    Recommendations/Plan: Patient interested in medication management.  Discussed reaching out to primary care provider for medication initiation.   provided education regarding medications whether taken daily or as needed.    Referrals: Patient to follow up with primary care doctor to discuss medication initiation.    Brittany Hicks [facility],  August 30, 2021  [facility]

## 2021-09-07 NOTE — PROGRESS NOTES
Melva is a 26 year old who is being evaluated via a billable video visit.      How would you like to obtain your AVS? MyChart  If the video visit is dropped, the invitation should be resent by: Text to cell phone: [phone]  Will anyone else be joining your video visit? No          Assessment & Plan       ICD-10-CM    1. Current moderate episode of major depressive disorder without prior episode (H)  F32.1 FLUoxetine (PROZAC) 20 MG capsule     MENTAL HEALTH REFERRAL  - Adult; Outpatient Treatment; Individual/Couples/Family/Group Therapy/Health Psychology; [facility] [phone]; We will contact you to schedule the appointment or please call with any questions   2. Anxiety  F41.9 FLUoxetine (PROZAC) 20 MG capsule     MENTAL HEALTH REFERRAL  - Adult; Outpatient Treatment; Individual/Couples/Family/Group Therapy/Health Psychology; [facility] [phone]; We will contact you to schedule the appointment or please call with any questions   3. Somatic dysfunction of pelvic region  M99.05       MDD/anxiety- new onset sx's, for the last several months.  Tends to have good/normal weeks, but then has bad weeks with significant mood and some anxiety sx's.  Getting a bit worse over time, more frequent bad weeks.  No h/o mdd/anxiety, no known significant triggers, minimal fam h/o (possibility of sister dx w/ mdd or bipolar in HS, but no issues for yrs, so suspect not bipolar).  Discussed options.  Will refer for thearpy, and start prozac 20mg/d.  Risks and benefits of medication(s) including potential side effects reviewed with patient.  Questions answered.   Weight ~236 lbs.  F/u in ~6 weeks for recheck for mood/weight.    Pelvic floor dysfunction- pt notes that the PT txt was very helpful, providers were great.      32 minutes spent on the date of the encounter doing chart review, interpretation of tests, patient visit and documentation        BMI:   Estimated body mass index is 42.42  "kg/m  as calculated from the following:    Height as of 3/2/21: 1.683 m (5' 6.25\").    Weight as of 3/2/21: 120.1 kg (264 lb 12.8 oz).   Weight management plan: Discussed healthy diet and exercise guidelines      Return in about 6 weeks (around 10/20/2021) for Depression follow-up, Anxiety follow-up.    Luz Maria Chiang MD  [facility]      Daniela Frye is a 26 year old who presents for the following health issues- worsening mood/anxiety    HPI     Answers for HPI/ROS submitted by the patient on 9/8/2021  If you checked off any problems, how difficult have these problems made it for you to do your work, take care of things at home, or get along with other people?: Somewhat difficult  PHQ9 TOTAL SCORE: 7        Depression and Anxiety Follow-Up    How are you doing with your depression since your last visit? New onset    How are you doing with your anxiety since your last visit?  New onset    Are you having other symptoms that might be associated with depression or anxiety? No    Have you had a significant life event? No     Do you have any concerns with your use of alcohol or other drugs? No    On/off sx's, good/bad weeks, bad weeks are more frequent.  Started to become more of an issue in spring of 2021.  Soon after her last appt in 3/21.  As things were opening back up.  Getting worse over that time.  More irritable - even on good weeks.  No real h/o depression or anxiety.    Bad week sx's- main sx's, comes in waves, feeling of helplessness, world is on her shoulders, out of control on things, very sad, very blue, can't do anything, low energy, uninvolved at work, simple tasks are difficult.    Friday night- crying in a restaurant for no reason.  Ramp up of feelings over the days piror.  Had panic attack.    This week, feels totally fine, can do anything.  Almost half of the weeks are bad.  Used to think it was just tied to her periods, but it hasn't.    Triggers- work can be a " trigger, air conditioning    Fam h/o MDD/Anxiety?  Sister in middle/HS - she was treated for bipolar and other things.  Thinks she's not having issues like that any more.  Pt has not had any manic sx's.     is on medications for depression.    PHQ-9 was 9 on 8/28/21, and 7 today.  ANN-7 was 9 on 8/30/21.    Weight - stable for ~3 yrs around 263 lbs.      Social History     Tobacco Use     Smoking status: Never Smoker     Smokeless tobacco: Never Used   Substance Use Topics     Alcohol use: Yes     Comment: occ.      Drug use: Never     No flowsheet data found.  ANN-7 SCORE 8/28/2021   Total Score 9 (mild anxiety)   Some encounter information is confidential and restricted. Go to Review Flowsheets activity to see all data.         Review of Systems   Constitutional, HEENT, cardiovascular, pulmonary, gi and gu systems are negative, except as otherwise noted.      Objective       Vitals:  No vitals were obtained today due to virtual visit.    Physical Exam   GENERAL: Healthy, alert and no distress  EYES: Eyes grossly normal to inspection.  No discharge or erythema, or obvious scleral/conjunctival abnormalities.  RESP: No audible wheeze, cough, or visible cyanosis.  No visible retractions or increased work of breathing.    SKIN: Visible skin clear. No significant rash, abnormal pigmentation or lesions.  NEURO: Cranial nerves grossly intact.  Mentation and speech appropriate for age.  PSYCH: Mentation appears normal, affect normal/bright, judgement and insight intact, normal speech and appearance well-groomed.            Video-Visit Details    Type of service:  Video Visit    Video End Time:1:23 PM (26 minute video)    Originating Location (pt. Location): Home    Distant Location (provider location):  [facility]     Platform used for Video Visit: [employer]

## 2021-09-08 ENCOUNTER — VIRTUAL VISIT (OUTPATIENT)
Dept: FAMILY MEDICINE | Facility: CLINIC | Age: 26
End: 2021-09-08
Payer: COMMERCIAL

## 2021-09-08 DIAGNOSIS — M99.05 SOMATIC DYSFUNCTION OF PELVIC REGION: ICD-10-CM

## 2021-09-08 DIAGNOSIS — F41.9 ANXIETY: ICD-10-CM

## 2021-09-08 DIAGNOSIS — F32.1 CURRENT MODERATE EPISODE OF MAJOR DEPRESSIVE DISORDER WITHOUT PRIOR EPISODE (H): Primary | ICD-10-CM

## 2021-09-08 PROCEDURE — 99214 OFFICE O/P EST MOD 30 MIN: CPT | Mod: 95 | Performed by: FAMILY MEDICINE

## 2021-09-08 ASSESSMENT — PATIENT HEALTH QUESTIONNAIRE - PHQ9
SUM OF ALL RESPONSES TO PHQ QUESTIONS 1-9: 7
10. IF YOU CHECKED OFF ANY PROBLEMS, HOW DIFFICULT HAVE THESE PROBLEMS MADE IT FOR YOU TO DO YOUR WORK, TAKE CARE OF THINGS AT HOME, OR GET ALONG WITH OTHER PEOPLE: SOMEWHAT DIFFICULT
SUM OF ALL RESPONSES TO PHQ QUESTIONS 1-9: 7

## 2021-10-10 ENCOUNTER — HEALTH MAINTENANCE LETTER (OUTPATIENT)
Age: 26
End: 2021-10-10

## 2021-12-30 NOTE — PROGRESS NOTES
Melva is a 26 year old who is being evaluated via a billable video visit.      How would you like to obtain your AVS? MyChart  If the video visit is dropped, the invitation should be resent by: Text to cell phone: -  Will anyone else be joining your video visit? No      Assessment & Plan       ICD-10-CM    1. Current moderate episode of major depressive disorder without prior episode (H)  F32.1 EMOTIONAL / BEHAVIORAL ASSESSMENT     FLUoxetine (PROZAC) 20 MG capsule   2. Anxiety  F41.9 EMOTIONAL / BEHAVIORAL ASSESSMENT     FLUoxetine (PROZAC) 20 MG capsule      MDD/Anxiety- Mood and anxiety symptoms all improved/resoolved - she thinks most changes were within two weeks of starting fluoxetine 20mg/d at her last visit in 9/21.  No se's.  Hasn't tracked her weight, but no appetite change, and clothes fitting fine/same.  She's also started exercising again which feels great, and has been more motivated, getting out of her shell, and has been able to tolerate job changes/issues much easier.  She tried, but hasn't been able to set up therapy.  With job change, she'd like to try and see if she can find one that is covered- not interested in referral at this point.  Will cont fluoxetine at 20mg/d.  Rec ~12 months of txt, then evaluate if/when she'd like to try to wean off.  Discussed q6mo f/u appts.    Return in about 6 months (around 7/4/2022) for Physical Exam, Anxiety follow-up, Depression follow-up.    Luz Maria Chiang MD  [facility]   Melva is a 26 year old who presents for the following health issues - mood/anxiety    History of Present Illness       Mental Health Follow-up:  Patient presents to follow-up on Depression & Anxiety.Patient's depression since last visit has been:  Good  The patient is not having other symptoms associated with depression.  Patient's anxiety since last visit has been:  Good  The patient is not having other symptoms associated with anxiety.  Any  significant life events: job concerns  Patient is not feeling anxious or having panic attacks.  Patient has no concerns about alcohol or drug use.     Social History  Tobacco Use    Smoking status: Never Smoker    Smokeless tobacco: Never Used  Alcohol use: Yes    Comment: occ.   Drug use: Never      Today's PHQ-9         PHQ-9 Total Score:     1   PHQ-9 Q9 Thoughts of better off dead/self-harm past 2 weeks :   Not at all   Thoughts of suicide or self harm:      Self-harm Plan:        Self-harm Action:          Safety concerns for self or others:           Answers for HPI/ROS submitted by the patient on 1/4/2022  If you checked off any problems, how difficult have these problems made it for you to do your work, take care of things at home, or get along with other people?: Not difficult at all  PHQ9 TOTAL SCORE: 1  ANN 7 TOTAL SCORE: 1      Depression and Anxiety Follow-Up    How are you doing with your depression since your last visit? Improved     How are you doing with your anxiety since your last visit?  Improved     Are you having other symptoms that might be associated with depression or anxiety? No    Have you had a significant life event? OTHER: quit job and now has a new one     Do you have any concerns with your use of alcohol or other drugs? No    Started prozac at last visit in 9/21.  Noticed something in the first two weeks.  Less crying, less lethargic, felt strong.  Felt she had the calm to take things one.  Stopped feeling anxious as well.    Se's - none noticed.    Started going to the gym- feels great.  Started going gym  Had anxiety in general, so before, hard to add it to her plate.  Now looks forward to it.  More motivated to do things and   Putting herself out there more.    Hasn't checked her weight, probably should.  Everything fits the same.    Therapy- hasn't found a therapist she likes yet.      Social History     Tobacco Use     Smoking status: Never Smoker     Smokeless tobacco: Never  Used   Substance Use Topics     Alcohol use: Yes     Comment: occ.      Drug use: Never     PHQ 8/28/2021 9/8/2021 1/4/2022   PHQ-9 Total Score 9 7 1   Q9: Thoughts of better off dead/self-harm past 2 weeks Not at all Not at all Not at all     ANN-7 SCORE 8/28/2021 1/4/2022   Total Score 9 (mild anxiety) 1 (minimal anxiety)   Total Score 9 1     Last PHQ-9 1/4/2022   1.  Little interest or pleasure in doing things 0   2.  Feeling down, depressed, or hopeless 0   3.  Trouble falling or staying asleep, or sleeping too much 0   4.  Feeling tired or having little energy 1   5.  Poor appetite or overeating 0   6.  Feeling bad about yourself 0   7.  Trouble concentrating 0   8.  Moving slowly or restless 0   Q9: Thoughts of better off dead/self-harm past 2 weeks 0   PHQ-9 Total Score 1   Some encounter information is confidential and restricted. Go to Review [employer] activity to see all data.     ANN-7  1/4/2022   1. Feeling nervous, anxious, or on edge 1   2. Not being able to stop or control worrying 0   3. Worrying too much about different things 0   4. Trouble relaxing 0   5. Being so restless that it is hard to sit still 0   6. Becoming easily annoyed or irritable 0   7. Feeling afraid, as if something awful might happen 0   ANN-7 Total Score 1   Some encounter information is confidential and restricted. Go to Review [employer] activity to see all data.       Review of Systems   Constitutional, HEENT, cardiovascular, pulmonary, gi and gu systems are negative, except as otherwise noted.      Objective           Vitals:  No vitals were obtained today due to virtual visit.    Physical Exam   GENERAL: Healthy, alert and no distress  EYES: Eyes grossly normal to inspection.  No discharge or erythema, or obvious scleral/conjunctival abnormalities.  RESP: No audible wheeze, cough, or visible cyanosis.  No visible retractions or increased work of breathing.    SKIN: Visible skin clear. No significant rash, abnormal  pigmentation or lesions.  NEURO: Cranial nerves grossly intact.  Mentation and speech appropriate for age.  PSYCH: Mentation appears normal, affect normal/bright, judgement and insight intact, normal speech and appearance well-groomed.      Video-Visit Details    Type of service:  Video Visit    Video End Time:5:00 PM (start time 4:50 PM)    Originating Location (pt. Location): Home    Distant Location (provider location):  [facility]     Platform used for Video Visit: [employer]

## 2022-01-04 ENCOUNTER — VIRTUAL VISIT (OUTPATIENT)
Dept: FAMILY MEDICINE | Facility: CLINIC | Age: 27
End: 2022-01-04

## 2022-01-04 DIAGNOSIS — F41.9 ANXIETY: ICD-10-CM

## 2022-01-04 DIAGNOSIS — F32.1 CURRENT MODERATE EPISODE OF MAJOR DEPRESSIVE DISORDER WITHOUT PRIOR EPISODE (H): ICD-10-CM

## 2022-01-04 PROCEDURE — 96127 BRIEF EMOTIONAL/BEHAV ASSMT: CPT | Mod: 95 | Performed by: FAMILY MEDICINE

## 2022-01-04 PROCEDURE — 99213 OFFICE O/P EST LOW 20 MIN: CPT | Mod: 95 | Performed by: FAMILY MEDICINE

## 2022-01-04 ASSESSMENT — PATIENT HEALTH QUESTIONNAIRE - PHQ9
10. IF YOU CHECKED OFF ANY PROBLEMS, HOW DIFFICULT HAVE THESE PROBLEMS MADE IT FOR YOU TO DO YOUR WORK, TAKE CARE OF THINGS AT HOME, OR GET ALONG WITH OTHER PEOPLE: NOT DIFFICULT AT ALL
SUM OF ALL RESPONSES TO PHQ QUESTIONS 1-9: 1
SUM OF ALL RESPONSES TO PHQ QUESTIONS 1-9: 1

## 2022-01-04 ASSESSMENT — ANXIETY QUESTIONNAIRES
7. FEELING AFRAID AS IF SOMETHING AWFUL MIGHT HAPPEN: NOT AT ALL
3. WORRYING TOO MUCH ABOUT DIFFERENT THINGS: NOT AT ALL
2. NOT BEING ABLE TO STOP OR CONTROL WORRYING: NOT AT ALL
1. FEELING NERVOUS, ANXIOUS, OR ON EDGE: SEVERAL DAYS
5. BEING SO RESTLESS THAT IT IS HARD TO SIT STILL: NOT AT ALL
7. FEELING AFRAID AS IF SOMETHING AWFUL MIGHT HAPPEN: NOT AT ALL
GAD7 TOTAL SCORE: 1
GAD7 TOTAL SCORE: 1
6. BECOMING EASILY ANNOYED OR IRRITABLE: NOT AT ALL
4. TROUBLE RELAXING: NOT AT ALL
GAD7 TOTAL SCORE: 1

## 2022-01-05 ASSESSMENT — ANXIETY QUESTIONNAIRES: GAD7 TOTAL SCORE: 1

## 2022-02-04 ENCOUNTER — MYC MEDICAL ADVICE (OUTPATIENT)
Dept: FAMILY MEDICINE | Facility: CLINIC | Age: 27
End: 2022-02-04

## 2022-05-22 ENCOUNTER — HEALTH MAINTENANCE LETTER (OUTPATIENT)
Age: 27
End: 2022-05-22

## 2022-07-21 DIAGNOSIS — F32.1 CURRENT MODERATE EPISODE OF MAJOR DEPRESSIVE DISORDER WITHOUT PRIOR EPISODE (H): ICD-10-CM

## 2022-07-21 DIAGNOSIS — F41.9 ANXIETY: ICD-10-CM

## 2022-07-21 NOTE — TELEPHONE ENCOUNTER
Medication is being filled for 1 time refill only due to:  Patient needs to be seen because due for physical.     Note from 1/4/22 VV:    Return in about 6 months (around 7/4/2022) for Physical Exam, Anxiety follow-up, Depression follow-up.     Letty Swanson RN

## 2022-08-05 ENCOUNTER — MYC REFILL (OUTPATIENT)
Dept: FAMILY MEDICINE | Facility: CLINIC | Age: 27
End: 2022-08-05

## 2022-08-05 DIAGNOSIS — F32.1 CURRENT MODERATE EPISODE OF MAJOR DEPRESSIVE DISORDER WITHOUT PRIOR EPISODE (H): ICD-10-CM

## 2022-08-05 DIAGNOSIS — F41.9 ANXIETY: ICD-10-CM

## 2022-08-05 NOTE — TELEPHONE ENCOUNTER
One month supply sent 7/21    Due for physical.  Lettuce Eatt message sent to patient asking her to schedule appointment.    Letty Swanson RN

## 2022-08-12 NOTE — TELEPHONE ENCOUNTER
Triage,   Melva called.   Scheduled for physical w/ [facility] on 10/11.   Hoping to have meds refilled to last her until then.   Please advise.   Thanks!  Sherita OVERTON

## 2022-08-16 ENCOUNTER — TELEPHONE (OUTPATIENT)
Dept: FAMILY MEDICINE | Facility: CLINIC | Age: 27
End: 2022-08-16

## 2022-08-16 DIAGNOSIS — F32.1 CURRENT MODERATE EPISODE OF MAJOR DEPRESSIVE DISORDER WITHOUT PRIOR EPISODE (H): ICD-10-CM

## 2022-08-16 DIAGNOSIS — F41.9 ANXIETY: ICD-10-CM

## 2022-08-16 NOTE — TELEPHONE ENCOUNTER
Patient currently at [facility].  [facility] pharmacy informed her insurance will only cover 90 day supply.  One month supply sent 8/12.    Future appointment 10/14 for physical .  Rx sent for #90.  Letty Swanson RN

## 2023-01-17 ENCOUNTER — OFFICE VISIT (OUTPATIENT)
Dept: FAMILY MEDICINE | Facility: CLINIC | Age: 28
End: 2023-01-17
Payer: COMMERCIAL

## 2023-01-17 VITALS
BODY MASS INDEX: 45.22 KG/M2 | HEIGHT: 66 IN | SYSTOLIC BLOOD PRESSURE: 126 MMHG | DIASTOLIC BLOOD PRESSURE: 77 MMHG | WEIGHT: 281.4 LBS | OXYGEN SATURATION: 97 % | TEMPERATURE: 96.8 F | HEART RATE: 69 BPM | RESPIRATION RATE: 14 BRPM

## 2023-01-17 DIAGNOSIS — Z00.00 ROUTINE GENERAL MEDICAL EXAMINATION AT A HEALTH CARE FACILITY: Primary | ICD-10-CM

## 2023-01-17 DIAGNOSIS — F41.9 ANXIETY: ICD-10-CM

## 2023-01-17 DIAGNOSIS — L98.9 SKIN LESION: ICD-10-CM

## 2023-01-17 DIAGNOSIS — Z11.59 NEED FOR HEPATITIS C SCREENING TEST: ICD-10-CM

## 2023-01-17 DIAGNOSIS — F32.1 CURRENT MODERATE EPISODE OF MAJOR DEPRESSIVE DISORDER WITHOUT PRIOR EPISODE (H): ICD-10-CM

## 2023-01-17 DIAGNOSIS — Z11.4 SCREENING FOR HIV (HUMAN IMMUNODEFICIENCY VIRUS): ICD-10-CM

## 2023-01-17 PROBLEM — E66.01 MORBID OBESITY (H): Status: ACTIVE | Noted: 2023-01-17

## 2023-01-17 PROCEDURE — 90715 TDAP VACCINE 7 YRS/> IM: CPT | Performed by: FAMILY MEDICINE

## 2023-01-17 PROCEDURE — 99213 OFFICE O/P EST LOW 20 MIN: CPT | Mod: 25 | Performed by: FAMILY MEDICINE

## 2023-01-17 PROCEDURE — 90471 IMMUNIZATION ADMIN: CPT | Performed by: FAMILY MEDICINE

## 2023-01-17 PROCEDURE — 0124A COVID-19 VACCINE BIVALENT BOOSTER 12+ (PFIZER): CPT | Performed by: FAMILY MEDICINE

## 2023-01-17 PROCEDURE — 91312 COVID-19 VACCINE BIVALENT BOOSTER 12+ (PFIZER): CPT | Performed by: FAMILY MEDICINE

## 2023-01-17 PROCEDURE — 99395 PREV VISIT EST AGE 18-39: CPT | Mod: 25 | Performed by: FAMILY MEDICINE

## 2023-01-17 ASSESSMENT — ENCOUNTER SYMPTOMS
DYSURIA: 0
ARTHRALGIAS: 0
ABDOMINAL PAIN: 0
WEAKNESS: 0
DIARRHEA: 0
DIZZINESS: 0
FREQUENCY: 0
NAUSEA: 0
NERVOUS/ANXIOUS: 0
PARESTHESIAS: 0
CONSTIPATION: 0
HEADACHES: 0
SORE THROAT: 0
MYALGIAS: 0
COUGH: 0
SHORTNESS OF BREATH: 0
CHILLS: 0
PALPITATIONS: 0
HEMATOCHEZIA: 0
EYE PAIN: 0
FEVER: 0
HEMATURIA: 0
HEARTBURN: 0
JOINT SWELLING: 0

## 2023-01-17 ASSESSMENT — PATIENT HEALTH QUESTIONNAIRE - PHQ9
SUM OF ALL RESPONSES TO PHQ QUESTIONS 1-9: 0
10. IF YOU CHECKED OFF ANY PROBLEMS, HOW DIFFICULT HAVE THESE PROBLEMS MADE IT FOR YOU TO DO YOUR WORK, TAKE CARE OF THINGS AT HOME, OR GET ALONG WITH OTHER PEOPLE: NOT DIFFICULT AT ALL
SUM OF ALL RESPONSES TO PHQ QUESTIONS 1-9: 0

## 2023-01-17 ASSESSMENT — PAIN SCALES - GENERAL: PAINLEVEL: NO PAIN (0)

## 2023-01-17 NOTE — PROGRESS NOTES
SUBJECTIVE:   CC: Melva is an 27 year old who presents for preventive health visit.      Patient has been advised of split billing requirements and indicates understanding: Yes     Healthy Habits:     Getting at least 3 servings of Calcium per day:  Yes    Bi-annual eye exam:  Yes    Dental care twice a year:  Yes    Sleep apnea or symptoms of sleep apnea:  None    Diet:  Regular (no restrictions)    Frequency of exercise:  2-3 days/week    Duration of exercise:  30-45 minutes    Taking medications regularly:  Yes    Medication side effects:  None    PHQ-2 Total Score: 0    Additional concerns today:  Yes    MDD/anxiety- on prozac 20mg/d  Started in 9/21, new onset of sx's for few months, worsening over time, no significant triggers.  Feels great on it.  Quit her job, went to a new one, and back to old job.  Stressors have decreased, which has allowed her to focus on things she needs focus on.  Did one therapy session.  Might want to try off in the summer.      Wt is up.  Didn't realize it until she went on the scale.  Sleeping well, eating well, exercise- rowing club, 3d/wk intense exercise (started that summer of '21).  Doesn't think she's eating differently unless eating better.  Clothes fitting the same.  Just odd as she had been at 260 lbs for ~5 yrs prior.        Today's PHQ-2 Score:   PHQ-2 ( 1999 Pfizer) 1/17/2023   Q1: Little interest or pleasure in doing things 0   Q2: Feeling down, depressed or hopeless 0   PHQ-2 Score 0   PHQ-2 Total Score (12-17 Years)- Positive if 3 or more points; Administer PHQ-A if positive -   Q1: Little interest or pleasure in doing things Not at all   Q2: Feeling down, depressed or hopeless Not at all   PHQ-2 Score 0           Social History     Tobacco Use     Smoking status: Never     Smokeless tobacco: Never   Substance Use Topics     Alcohol use: Yes     Comment: occ.      If you drink alcohol do you typically have >3 drinks per day or >7 drinks per week? No    Alcohol Use  1/17/2023   Prescreen: >3 drinks/day or >7 drinks/week? No   Prescreen: >3 drinks/day or >7 drinks/week? -     Reviewed orders with patient.  Reviewed health maintenance and updated orders accordingly - Yes      Lab work is in process  Labs reviewed in EPIC  BP Readings from Last 3 Encounters:   01/17/23 126/77   03/02/21 126/85   02/26/20 130/82    Wt Readings from Last 3 Encounters:   01/17/23 127.6 kg (281 lb 6.4 oz)   03/02/21 120.1 kg (264 lb 12.8 oz)   02/26/20 119.9 kg (264 lb 4.8 oz)                  Patient Active Problem List   Diagnosis     Somatic dysfunction of pelvic region     BMI 45.0-49.9, adult (H)     Current moderate episode of major depressive disorder without prior episode (H)     Anxiety     History reviewed. No pertinent surgical history.    Social History     Tobacco Use     Smoking status: Never     Smokeless tobacco: Never   Substance Use Topics     Alcohol use: Yes     Comment: occ.      Family History   Problem Relation Age of Onset     Allergies Mother      Eye Disorder Father      Diabetes Maternal Grandfather      Coronary Artery Disease Paternal Grandfather 85        MI         Current Outpatient Medications   Medication Sig Dispense Refill     FLUoxetine (PROZAC) 20 MG capsule Take 1 capsule (20 mg) by mouth daily 90 capsule 1     No Known Allergies  No lab results found.     Breast Cancer Screening:    Breast CA Risk Assessment (FHS-7) 3/2/2021   Do you have a family history of breast, colon, or ovarian cancer? No / Unknown         Patient under 40 years of age: Routine Mammogram Screening not recommended.   Pertinent mammograms are reviewed under the imaging tab.    History of abnormal Pap smear: NO - age 21-29 PAP every 3 years recommended  PAP / HPV 3/2/2021   PAP (Historical) NIL     Reviewed and updated as needed this visit by clinical staff   Tobacco  Allergies  Meds  Problems  Med Hx  Surg Hx  Fam Hx          Reviewed and updated as needed this visit by Provider    "Tobacco  Allergies  Meds  Problems  Med Hx  Surg Hx  Fam Hx             Review of Systems   Constitutional: Negative for chills and fever.   HENT: Negative for congestion, ear pain, hearing loss and sore throat.    Eyes: Negative for pain and visual disturbance.   Respiratory: Negative for cough and shortness of breath.    Cardiovascular: Negative for chest pain, palpitations and peripheral edema.   Gastrointestinal: Negative for abdominal pain, constipation, diarrhea, heartburn, hematochezia and nausea.   Genitourinary: Negative for dysuria, frequency, genital sores, hematuria and urgency.   Musculoskeletal: Negative for arthralgias, joint swelling and myalgias.   Skin: Negative for rash.   Neurological: Negative for dizziness, weakness, headaches and paresthesias.   Psychiatric/Behavioral: Negative for mood changes. The patient is not nervous/anxious.           OBJECTIVE:   /77   Pulse 69   Temp 96.8  F (36  C) (Temporal)   Resp 14   Ht 1.683 m (5' 6.25\")   Wt 127.6 kg (281 lb 6.4 oz)   LMP 01/13/2023 (Exact Date)   SpO2 97%   BMI 45.08 kg/m    Physical Exam  GENERAL: healthy, alert and no distress  EYES: Eyes grossly normal to inspection, PERRL and conjunctivae and sclerae normal  HENT: ear canals and TM's normal, nose and mouth without ulcers or lesions  NECK: no adenopathy, no asymmetry, masses, or scars and thyroid normal to palpation  RESP: lungs clear to auscultation - no rales, rhonchi or wheezes  BREAST: normal without masses, tenderness or nipple discharge and no palpable axillary masses or adenopathy  CV: regular rate and rhythm, normal S1 S2, no S3 or S4, no murmur, click or rub, no peripheral edema and peripheral pulses strong  ABDOMEN: soft, nontender, no hepatosplenomegaly, no masses and bowel sounds normal  MS: no gross musculoskeletal defects noted, no edema  SKIN: no suspicious lesions or rashes  NEURO: Normal strength and tone, mentation intact and speech normal  PSYCH: " mentation appears normal, affect normal/bright    Diagnostic Test Results:  Labs reviewed in Epic    ASSESSMENT/PLAN:       ICD-10-CM    1. Routine general medical examination at a health care facility  Z00.00 HIV Antigen Antibody Combo     Hepatitis C Screen Reflex to HCV RNA Quant and Genotype     TDAP VACCINE (Adacel, Boostrix)     COVID-19,PF,PFIZER BOOSTER BIVALENT (12+YRS)     Lipid panel reflex to direct LDL Fasting     Comprehensive metabolic panel (BMP + Alb, Alk Phos, ALT, AST, Total. Bili, TP)      2. Current moderate episode of major depressive disorder without prior episode (H)  F32.1 FLUoxetine (PROZAC) 20 MG capsule      3. Anxiety  F41.9 FLUoxetine (PROZAC) 20 MG capsule      4. BMI 45.0-49.9, adult (H)  Z68.42 Lipid panel reflex to direct LDL Fasting     Comprehensive metabolic panel (BMP + Alb, Alk Phos, ALT, AST, Total. Bili, TP)      5. Skin lesion  L98.9 Adult Dermatology Referral      6. Screening for HIV (human immunodeficiency virus)  Z11.4 HIV Antigen Antibody Combo      7. Need for hepatitis C screening test  Z11.59 Hepatitis C Screen Reflex to HCV RNA Quant and Genotype        CPE- Discussed diet, calcium/D and exercise.  Thin prep pap was not done.  Eye and dental care UTD or recommended f/u.  COVID bivalent immunizations needed today.  See orders below for tests ordered and screening needed.     MDD/anxiety- on prozac 20mg/d  Started in 9/21, new onset of sx's for few months, worsening over time, no significant triggers.  Feels great on it.  Stressors have decreased, which has allowed her to focus on things she needs focus on.  Did one therapy session.  Might want to try off in the summer, but would like to continue on for the winter/spring.  Refillls sent, follow-up in 6 months.    BMI 45-   Wt is up.  Didn't realize it until she went on the scale.  Sleeping well, eating well, exercise- rowing club, 3d/wk intense exercise (started that summer of '21).  Doesn't think she's eating  differently unless eating better.  Clothes fitting the same.  Just odd as she had been at 260 lbs for ~5 yrs prior.  She would like to work on diet/exercise and try to get back down to her 260 lb baseline.    Dark skin mole on R upper shoulder- ~8mm, raised, possibly changing, referral placed to derm.        Patient has been advised of split billing requirements and indicates understanding: Yes      COUNSELING:  Reviewed preventive health counseling, as reflected in patient instructions  Special attention given to:        Regular exercise       Healthy diet/nutrition       Osteoporosis prevention/bone health        She reports that she has never smoked. She has never used smokeless tobacco.      Luz Maria Chiang MD  [facility]  Answers for HPI/ROS submitted by the patient on 1/17/2023  If you checked off any problems, how difficult have these problems made it for you to do your work, take care of things at home, or get along with other people?: Not difficult at all  PHQ9 TOTAL SCORE: 0

## 2023-01-17 NOTE — NURSING NOTE
Prior to immunization administration, verified patients identity using patient s name and date of birth. Please see Immunization Activity for additional information.     Screening Questionnaire for Adult Immunization    Are you sick today?   No   Do you have allergies to medications, food, a vaccine component or latex?   No   Have you ever had a serious reaction after receiving a vaccination?   No   Do you have a long-term health problem with heart, lung, kidney, or metabolic disease (e.g., diabetes), asthma, a blood disorder, no spleen, complement component deficiency, a cochlear implant, or a spinal fluid leak?  Are you on long-term aspirin therapy?   No   Do you have cancer, leukemia, HIV/AIDS, or any other immune system problem?   No   Do you have a parent, brother, or sister with an immune system problem?   No   In the past 3 months, have you taken medications that affect  your immune system, such as prednisone, other steroids, or anticancer drugs; drugs for the treatment of rheumatoid arthritis, Crohn s disease, or psoriasis; or have you had radiation treatments?   No   Have you had a seizure, or a brain or other nervous system problem?   No   During the past year, have you received a transfusion of blood or blood    products, or been given immune (gamma) globulin or antiviral drug?   No   For women: Are you pregnant or is there a chance you could become       pregnant during the next month?   No   Have you received any vaccinations in the past 4 weeks?   No     Immunization questionnaire answers were all negative.        Per orders of Dr. Chiang, injection of Tdap and Pfizer COVID Bi-valent given by Cierra Rogers CMA. Patient instructed to remain in clinic for 15 minutes afterwards, and to report any adverse reaction to me immediately.       Screening performed by Cierra Rogers CMA on 1/17/2023 at 10:52 AM.

## 2023-03-16 ENCOUNTER — TELEPHONE (OUTPATIENT)
Dept: DERMATOLOGY | Facility: CLINIC | Age: 28
End: 2023-03-16
Payer: COMMERCIAL

## 2023-06-02 ENCOUNTER — TELEPHONE (OUTPATIENT)
Dept: DERMATOLOGY | Facility: CLINIC | Age: 28
End: 2023-06-02
Payer: COMMERCIAL

## 2023-06-02 NOTE — TELEPHONE ENCOUNTER
Gray, sent [facility], 1st attempt informing patient appt with Dr. Lala 6/6 will need to be rescheduled. Dr. Lala will not be in clinic that date. Please call back at [phone] to r/s.

## 2023-06-05 ENCOUNTER — TELEPHONE (OUTPATIENT)
Dept: DERMATOLOGY | Facility: CLINIC | Age: 28
End: 2023-06-05
Payer: COMMERCIAL

## 2023-06-05 NOTE — TELEPHONE ENCOUNTER
Lvm, 2nd attempt to inform patient appt. With Dr. Lala 6/6 has been cancelled. Dr. Lala will not be in clinic tomorrow.

## 2023-08-31 ENCOUNTER — OFFICE VISIT (OUTPATIENT)
Dept: DERMATOLOGY | Facility: CLINIC | Age: 28
End: 2023-08-31
Attending: FAMILY MEDICINE
Payer: COMMERCIAL

## 2023-08-31 DIAGNOSIS — L81.4 LENTIGO: Primary | ICD-10-CM

## 2023-08-31 DIAGNOSIS — D22.9 MULTIPLE BENIGN NEVI: ICD-10-CM

## 2023-08-31 PROCEDURE — 99203 OFFICE O/P NEW LOW 30 MIN: CPT | Mod: GC | Performed by: STUDENT IN AN ORGANIZED HEALTH CARE EDUCATION/TRAINING PROGRAM

## 2023-08-31 ASSESSMENT — PAIN SCALES - GENERAL: PAINLEVEL: NO PAIN (0)

## 2023-08-31 NOTE — PATIENT INSTRUCTIONS

## 2023-08-31 NOTE — NURSING NOTE
Chief Complaint   Patient presents with    Skin Check     Pt reports a mole of concern on posterior R shoulder.     Clifford Jordan, EMT

## 2023-08-31 NOTE — LETTER
8/31/2023       RE: Melva Torres  [address]  [place] [address]     Dear Colleague,    Thank you for referring your patient, Melva Torres, to the [facility] [place] at [facility]. Please see a copy of my visit note below.    [facility] Dermatology Note  Encounter Date: Aug 31, 2023  Office Visit     Dermatology Problem List:  FBSE 8/31/23 -- no concerning findings  ____________________________________________    Assessment & Plan:   #multiple benign nevi.  #lentigo  Reassured of benign nature of skin lesions. Photos taken on the back right shoulder.   -Recommended use of a broad spectrum sunscreen of at least SPF 30 on all sun exposed sites daily.  Apply 20 minutes prior to exposure and repeat application every two hours or after sweating or swimming.  Avoid any intentional indoor or outdoor tanning.    -provided materials on the ABCDEs of melanoma screening    Procedures Performed: None    Follow-up: PRN for new or changing lesions, consider starting more routine skin checks around age 35    Staff and Resident:     Staffed with Dr. QUINCY Barnhart.     Robert Cabello, MD  Medicine-Dermatology Resident, PGY-2  ____________________________________________    CC: Skin Check (Pt reports a mole of concern on posterior R shoulder.)    HPI:  Ms. Melva Torres is a(n) 28 year old female who presents today as a new patient for skin check.     -has mole on right cheek and left shoulder --> not changing, sometimes skin flakes off the top of them  -history of KP, uses salicyclic acid creams + korean exfoliating bar  -no family history of skin cancer, dad has vitiligo  -history of many sun burns when she was younger    Patient is otherwise feeling well, without additional skin concerns.    On the rowing team.     Labs Reviewed:  N/A    Physical Exam:  Vitals: There were no vitals taken for this visit.  SKIN: Total skin excluding the  undergarment areas was performed. The exam included the head/face, neck, both arms, chest, back, abdomen, both legs, digits and/or nails.   - ~1cm diameter nevi left shoulder with regular reticulated pigment network and central hypopigmentation on dermoscopy  - There are dome shaped bright red papules on the trunk and extremities.   - Multiple regular brown pigmented macules and papules are identified on the trunk and extremities.   - Scattered brown macules on sun exposed areas.  - No other lesions of concern on areas examined.         Medications:  Current Outpatient Medications   Medication    FLUoxetine (PROZAC) 20 MG capsule     No current facility-administered medications for this visit.      Past Medical History:   Patient Active Problem List   Diagnosis    Somatic dysfunction of pelvic region    BMI 45.0-49.9, adult (H)    Current moderate episode of major depressive disorder without prior episode (H)    Anxiety     Past Medical History:   Diagnosis Date    Current moderate episode of major depressive disorder without prior episode (H) 1/17/2023        Luz Maria Chiang MD  [address] [place] [address] on close of this encounter.    Attestation signed by Pedro Barnhart MD at 8/31/2023  3:38 PM:  I have personally examined this patient and agree with the resident doctor's documentation and plan of care. I have reviewed and amended the resident's note. The documentation accurately reflects my clinical observations, diagnoses, treatment and follow-up plans.     Pedro Barnhart MD  Dermatology Staff

## 2023-08-31 NOTE — PROGRESS NOTES
[facility] Dermatology Note  Encounter Date: Aug 31, 2023  Office Visit     Dermatology Problem List:  FBSE 8/31/23 -- no concerning findings  ____________________________________________    Assessment & Plan:   #multiple benign nevi.  #lentigo  Reassured of benign nature of skin lesions. Photos taken on the back right shoulder.   -Recommended use of a broad spectrum sunscreen of at least SPF 30 on all sun exposed sites daily.  Apply 20 minutes prior to exposure and repeat application every two hours or after sweating or swimming.  Avoid any intentional indoor or outdoor tanning.    -provided materials on the ABCDEs of melanoma screening    Procedures Performed: None    Follow-up: PRN for new or changing lesions, consider starting more routine skin checks around age 35    Staff and Resident:     Staffed with Dr. QUINCY Barnhart.     Robert Cabello MD  Medicine-Dermatology Resident, PGY-2  ____________________________________________    CC: Skin Check (Pt reports a mole of concern on posterior R shoulder.)    HPI:  Ms. Melva Torres is a(n) 28 year old female who presents today as a new patient for skin check.     -has mole on right cheek and left shoulder --> not changing, sometimes skin flakes off the top of them  -history of KP, uses salicyclic acid creams + korean exfoliating bar  -no family history of skin cancer, dad has vitiligo  -history of many sun burns when she was younger    Patient is otherwise feeling well, without additional skin concerns.    On the rowing team.     Labs Reviewed:  N/A    Physical Exam:  Vitals: There were no vitals taken for this visit.  SKIN: Total skin excluding the undergarment areas was performed. The exam included the head/face, neck, both arms, chest, back, abdomen, both legs, digits and/or nails.   - ~1cm diameter nevi left shoulder with regular reticulated pigment network and central hypopigmentation on dermoscopy  - There are dome shaped bright red papules on the  trunk and extremities.   - Multiple regular brown pigmented macules and papules are identified on the trunk and extremities.   - Scattered brown macules on sun exposed areas.  - No other lesions of concern on areas examined.         Medications:  Current Outpatient Medications   Medication    FLUoxetine (PROZAC) 20 MG capsule     No current facility-administered medications for this visit.      Past Medical History:   Patient Active Problem List   Diagnosis    Somatic dysfunction of pelvic region    BMI 45.0-49.9, adult (H)    Current moderate episode of major depressive disorder without prior episode (H)    Anxiety     Past Medical History:   Diagnosis Date    Current moderate episode of major depressive disorder without prior episode (H) 1/17/2023       CC Luz Maria Chiang MD  [address] [facility] [address]  [place] [address] on close of this encounter.

## 2023-09-13 DIAGNOSIS — F41.9 ANXIETY: ICD-10-CM

## 2023-09-13 DIAGNOSIS — F32.1 CURRENT MODERATE EPISODE OF MAJOR DEPRESSIVE DISORDER WITHOUT PRIOR EPISODE (H): ICD-10-CM

## 2023-12-10 DIAGNOSIS — F32.1 CURRENT MODERATE EPISODE OF MAJOR DEPRESSIVE DISORDER WITHOUT PRIOR EPISODE (H): ICD-10-CM

## 2023-12-10 DIAGNOSIS — F41.9 ANXIETY: ICD-10-CM

## 2023-12-31 DIAGNOSIS — F32.1 CURRENT MODERATE EPISODE OF MAJOR DEPRESSIVE DISORDER WITHOUT PRIOR EPISODE (H): ICD-10-CM

## 2023-12-31 DIAGNOSIS — F41.9 ANXIETY: ICD-10-CM

## 2024-01-04 ENCOUNTER — PATIENT OUTREACH (OUTPATIENT)
Dept: CARE COORDINATION | Facility: CLINIC | Age: 29
End: 2024-01-04
Payer: COMMERCIAL

## 2024-01-18 ENCOUNTER — PATIENT OUTREACH (OUTPATIENT)
Dept: CARE COORDINATION | Facility: CLINIC | Age: 29
End: 2024-01-18
Payer: COMMERCIAL

## 2024-02-05 ENCOUNTER — MYC REFILL (OUTPATIENT)
Dept: FAMILY MEDICINE | Facility: CLINIC | Age: 29
End: 2024-02-05
Payer: COMMERCIAL

## 2024-02-05 DIAGNOSIS — F41.9 ANXIETY: ICD-10-CM

## 2024-02-05 DIAGNOSIS — F32.1 CURRENT MODERATE EPISODE OF MAJOR DEPRESSIVE DISORDER WITHOUT PRIOR EPISODE (H): ICD-10-CM

## 2024-02-25 ENCOUNTER — HEALTH MAINTENANCE LETTER (OUTPATIENT)
Age: 29
End: 2024-02-25

## 2024-04-09 SDOH — HEALTH STABILITY: PHYSICAL HEALTH: ON AVERAGE, HOW MANY MINUTES DO YOU ENGAGE IN EXERCISE AT THIS LEVEL?: 60 MIN

## 2024-04-09 SDOH — HEALTH STABILITY: PHYSICAL HEALTH: ON AVERAGE, HOW MANY DAYS PER WEEK DO YOU ENGAGE IN MODERATE TO STRENUOUS EXERCISE (LIKE A BRISK WALK)?: 4 DAYS

## 2024-04-09 ASSESSMENT — SOCIAL DETERMINANTS OF HEALTH (SDOH): HOW OFTEN DO YOU GET TOGETHER WITH FRIENDS OR RELATIVES?: MORE THAN THREE TIMES A WEEK

## 2024-04-16 ENCOUNTER — OFFICE VISIT (OUTPATIENT)
Dept: FAMILY MEDICINE | Facility: CLINIC | Age: 29
End: 2024-04-16
Payer: COMMERCIAL

## 2024-04-16 VITALS
OXYGEN SATURATION: 100 % | DIASTOLIC BLOOD PRESSURE: 77 MMHG | RESPIRATION RATE: 16 BRPM | SYSTOLIC BLOOD PRESSURE: 123 MMHG | BODY MASS INDEX: 44.46 KG/M2 | TEMPERATURE: 98.1 F | HEART RATE: 93 BPM | WEIGHT: 283.3 LBS | HEIGHT: 67 IN

## 2024-04-16 DIAGNOSIS — Z12.4 CERVICAL CANCER SCREENING: ICD-10-CM

## 2024-04-16 DIAGNOSIS — N92.6 LATE PERIOD: ICD-10-CM

## 2024-04-16 DIAGNOSIS — Z00.00 ROUTINE GENERAL MEDICAL EXAMINATION AT A HEALTH CARE FACILITY: Primary | ICD-10-CM

## 2024-04-16 DIAGNOSIS — F32.1 CURRENT MODERATE EPISODE OF MAJOR DEPRESSIVE DISORDER WITHOUT PRIOR EPISODE (H): ICD-10-CM

## 2024-04-16 DIAGNOSIS — M99.05 SOMATIC DYSFUNCTION OF PELVIC REGION: ICD-10-CM

## 2024-04-16 LAB — HCG UR QL: POSITIVE

## 2024-04-16 PROCEDURE — G0145 SCR C/V CYTO,THINLAYER,RESCR: HCPCS | Performed by: FAMILY MEDICINE

## 2024-04-16 PROCEDURE — 81025 URINE PREGNANCY TEST: CPT | Performed by: FAMILY MEDICINE

## 2024-04-16 PROCEDURE — 99395 PREV VISIT EST AGE 18-39: CPT | Performed by: FAMILY MEDICINE

## 2024-04-16 PROCEDURE — 87624 HPV HI-RISK TYP POOLED RSLT: CPT | Performed by: FAMILY MEDICINE

## 2024-04-16 ASSESSMENT — ANXIETY QUESTIONNAIRES
GAD7 TOTAL SCORE: 0
7. FEELING AFRAID AS IF SOMETHING AWFUL MIGHT HAPPEN: NOT AT ALL
IF YOU CHECKED OFF ANY PROBLEMS ON THIS QUESTIONNAIRE, HOW DIFFICULT HAVE THESE PROBLEMS MADE IT FOR YOU TO DO YOUR WORK, TAKE CARE OF THINGS AT HOME, OR GET ALONG WITH OTHER PEOPLE: NOT DIFFICULT AT ALL
7. FEELING AFRAID AS IF SOMETHING AWFUL MIGHT HAPPEN: NOT AT ALL
GAD7 TOTAL SCORE: 0
1. FEELING NERVOUS, ANXIOUS, OR ON EDGE: NOT AT ALL
6. BECOMING EASILY ANNOYED OR IRRITABLE: NOT AT ALL
4. TROUBLE RELAXING: NOT AT ALL
3. WORRYING TOO MUCH ABOUT DIFFERENT THINGS: NOT AT ALL
GAD7 TOTAL SCORE: 0
8. IF YOU CHECKED OFF ANY PROBLEMS, HOW DIFFICULT HAVE THESE MADE IT FOR YOU TO DO YOUR WORK, TAKE CARE OF THINGS AT HOME, OR GET ALONG WITH OTHER PEOPLE?: NOT DIFFICULT AT ALL
2. NOT BEING ABLE TO STOP OR CONTROL WORRYING: NOT AT ALL
5. BEING SO RESTLESS THAT IT IS HARD TO SIT STILL: NOT AT ALL

## 2024-04-16 ASSESSMENT — PAIN SCALES - GENERAL: PAINLEVEL: MILD PAIN (2)

## 2024-04-16 ASSESSMENT — PATIENT HEALTH QUESTIONNAIRE - PHQ9
SUM OF ALL RESPONSES TO PHQ QUESTIONS 1-9: 2
SUM OF ALL RESPONSES TO PHQ QUESTIONS 1-9: 2
10. IF YOU CHECKED OFF ANY PROBLEMS, HOW DIFFICULT HAVE THESE PROBLEMS MADE IT FOR YOU TO DO YOUR WORK, TAKE CARE OF THINGS AT HOME, OR GET ALONG WITH OTHER PEOPLE: NOT DIFFICULT AT ALL

## 2024-04-16 NOTE — PATIENT INSTRUCTIONS
Preventive Care Advice   This is general advice given by our system to help you stay healthy. However, your care team may have specific advice just for you. Please talk to your care team about your preventive care needs.  Nutrition  Eat 5 or more servings of fruits and vegetables each day.  Try wheat bread, brown rice and whole grain pasta (instead of white bread, rice, and pasta).  Get enough calcium and vitamin D. Check the label on foods and aim for 100% of the RDA (recommended daily allowance).  Lifestyle  Exercise at least 150 minutes each week   (30 minutes a day, 5 days a week).  Do muscle strengthening activities 2 days a week. These help control your weight and prevent disease.  No smoking.  Wear sunscreen to prevent skin cancer.  Have a dental exam and cleaning every 6 months.  Yearly exams  See your health care team every year to talk about:  Any changes in your health.  Any medicines your care team has prescribed.  Preventive care, family planning, and ways to prevent chronic diseases.  Shots (vaccines)   HPV shots (up to age 26), if you've never had them before.  Hepatitis B shots (up to age 59), if you've never had them before.  COVID-19 shot: Get this shot when it's due.  Flu shot: Get a flu shot every year.  Tetanus shot: Get a tetanus shot every 10 years.  Pneumococcal, hepatitis A, and RSV shots: Ask your care team if you need these based on your risk.  Shingles shot (for age 50 and up).  General health tests  Diabetes screening:  Starting at age 35, Get screened for diabetes at least every 3 years.  If you are younger than age 35, ask your care team if you should be screened for diabetes.  Cholesterol test: At age 39, start having a cholesterol test every 5 years, or more often if advised.  Bone density scan (DEXA): At age 50, ask your care team if you should have this scan for osteoporosis (brittle bones).  Hepatitis C: Get tested at least once in your life.  STIs (sexually transmitted  infections)  Before age 24: Ask your care team if you should be screened for STIs.  After age 24: Get screened for STIs if you're at risk. You are at risk for STIs (including HIV) if:  You are sexually active with more than one person.  You don't use condoms every time.  You or a partner was diagnosed with a sexually transmitted infection.  If you are at risk for HIV, ask about PrEP medicine to prevent HIV.  Get tested for HIV at least once in your life, whether you are at risk for HIV or not.  Cancer screening tests  Cervical cancer screening: If you have a cervix, begin getting regular cervical cancer screening tests at age 21. Most people who have regular screenings with normal results can stop after age 65. Talk about this with your provider.  Breast cancer scan (mammogram): If you've ever had breasts, begin having regular mammograms starting at age 40. This is a scan to check for breast cancer.  Colon cancer screening: It is important to start screening for colon cancer at age 45.  Have a colonoscopy test every 10 years (or more often if you're at risk) Or, ask your provider about stool tests like a FIT test every year or Cologuard test every 3 years.  To learn more about your testing options, visit: https://www.[facility]/641016.pdf.  For help making a decision, visit: https://bit.ly/ik89889.  Prostate cancer screening test: If you have a prostate and are age 55 to 69, ask your provider if you would benefit from a yearly prostate cancer screening test.  Lung cancer screening: If you are a current or former smoker age 50 to 80, ask your care team if ongoing lung cancer screenings are right for you.  For informational purposes only. Not to replace the advice of your health care provider. Copyright   2023 [facility] [employer]. All rights reserved. Clinically reviewed by the [facility] Transitions Program. [employer] 714620 - REV 01/24.    Learning About Stress  What is stress?     Stress is your  body's response to a hard situation. Your body can have a physical, emotional, or mental response. Stress is a fact of life for most people, and it affects everyone differently. What causes stress for you may not be stressful for someone else.  A lot of things can cause stress. You may feel stress when you go on a job interview, take a test, or run a race. This kind of short-term stress is normal and even useful. It can help you if you need to work hard or react quickly. For example, stress can help you finish an important job on time.  Long-term stress is caused by ongoing stressful situations or events. Examples of long-term stress include long-term health problems, ongoing problems at work, or conflicts in your family. Long-term stress can harm your health.  How does stress affect your health?  When you are stressed, your body responds as though you are in danger. It makes hormones that speed up your heart, make you breathe faster, and give you a burst of energy. This is called the fight-or-flight stress response. If the stress is over quickly, your body goes back to normal and no harm is done.  But if stress happens too often or lasts too long, it can have bad effects. Long-term stress can make you more likely to get sick, and it can make symptoms of some diseases worse. If you tense up when you are stressed, you may develop neck, shoulder, or low back pain. Stress is linked to high blood pressure and heart disease.  Stress also harms your emotional health. It can make you [place], tense, or depressed. Your relationships may suffer, and you may not do well at work or school.  What can you do to manage stress?  You can try these things to help manage stress:   Do something active. Exercise or activity can help reduce stress. Walking is a great way to get started. Even everyday activities such as housecleaning or yard work can help.  Try yoga or hayde chi. These techniques combine exercise and meditation. You may need  some training at first to learn them.  Do something you enjoy. For example, listen to music or go to a movie. Practice your hobby or do volunteer work.  Meditate. This can help you relax, because you are not worrying about what happened before or what may happen in the future.  Do guided imagery. Imagine yourself in any setting that helps you feel calm. You can use online videos, books, or a teacher to guide you.  Do breathing exercises. For example:  From a standing position, bend forward from the waist with your knees slightly bent. Let your arms dangle close to the floor.  Breathe in slowly and deeply as you return to a standing position. Roll up slowly and lift your head last.  Hold your breath for just a few seconds in the standing position.  Breathe out slowly and bend forward from the waist.  Let your feelings out. Talk, laugh, cry, and express anger when you need to. Talking with supportive friends or family, a counselor, or a chrissie leader about your feelings is a healthy way to relieve stress. Avoid discussing your feelings with people who make you feel worse.  Write. It may help to write about things that are bothering you. This helps you find out how much stress you feel and what is causing it. When you know this, you can find better ways to cope.  What can you do to prevent stress?  You might try some of these things to help prevent stress:  Manage your time. This helps you find time to do the things you want and need to do.  Get enough sleep. Your body recovers from the stresses of the day while you are sleeping.  Get support. Your family, friends, and community can make a difference in how you experience stress.  Limit your news feed. Avoid or limit time on social media or news that may make you feel stressed.  Do something active. Exercise or activity can help reduce stress. Walking is a great way to get started.  Where can you learn more?  Go to https://www.healthwise.net/patiented  Enter N032 in the  "search box to learn more about \"Learning About Stress.\"  Current as of: October 24, 2023               Content Version: 14.0    7282-2862 [facility].   Care instructions adapted under license by your healthcare professional. If you have questions about a medical condition or this instruction, always ask your healthcare professional. [facility] disclaims any warranty or liability for your use of this information.      "

## 2024-04-16 NOTE — PROGRESS NOTES
"Preventive Care Visit  [facility]  Luz Maria Chiang MD, Family Medicine  Apr 16, 2024      Assessment & Plan     Routine general medical examination at a health care facility  Discussed diet, calcium and exercise.  Great exercise, good diet.   Thin prep pap was done.  Eye and dental care UTD or recommended f/u.  No immunizations needed today.  See orders below for tests ordered and screening needed.     Late period  Pregnancy confirmed, pt congratulated.  Taking PNV.  Discussed do's/don'ts of pregnancy, medication and food issues, exercise, and first trimester issues, including risk of SAB.  Discussed OB care options and f/u visits.   - HCG qualitative urine; Future  - HCG qualitative urine    Cervical cancer screening  - Pap Screen with HPV - recommended age 30 - 65 years    Current moderate episode of major depressive disorder without prior episode (H)  Mood has been very good.  Has been on the fluoxetine for ~3 yrs.  Went on it when she was in a very tough job and med was very helpful, but overall mood has been good during much of the rest of her life.  Now in a better place- likes her job, good relationship.  Will have her wean down and stop the fluoxetine and see how she does.  Schedule video visit if return of persistent sx's when off the fluoxetine, urgently if in post-partum period.    Somatic dysfunction of pelvic region  Much improved with PFPT- had been having pain with intercourse, now much better.    Patient has been advised of split billing requirements and indicates understanding: Yes        BMI  Estimated body mass index is 44.94 kg/m  as calculated from the following:    Height as of this encounter: 1.691 m (5' 6.58\").    Weight as of this encounter: 128.5 kg (283 lb 4.8 oz).   Weight management plan: Discussed healthy diet and exercise guidelines    Counseling  Appropriate preventive services were discussed with this patient, including applicable screening as appropriate " for fall prevention, nutrition, physical activity, Tobacco-use cessation, weight loss and cognition.  Checklist reviewing preventive services available has been given to the patient.  Reviewed patient's diet, addressing concerns and/or questions.   She is at risk for psychosocial distress and has been provided with information to reduce risk.               Daniela Frye is a 29 year old, presenting for the following:  Physical        4/16/2024     9:13 AM   Additional Questions   Roomed by jamie escalona   Accompanied by angel calvin         4/16/2024     9:13 AM   Patient Reported Additional Medications   Patient reports taking the following new medications n/a      Answers submitted by the patient for this visit:  Patient Health Questionnaire (Submitted on 4/16/2024)  If you checked off any problems, how difficult have these problems made it for you to do your work, take care of things at home, or get along with other people?: Not difficult at all  PHQ9 TOTAL SCORE: 2  ANN-7 (Submitted on 4/16/2024)  ANN 7 TOTAL SCORE: 0    Health Care Directive  Patient does not have a Health Care Directive or Living Will: Discussed advance care planning with patient; information given to patient to review.    HPI    LMP 3/7/23.  5 /12 wks gestation.  First pregnancy.  Preg test positive at home on Sunday.  Trying ~3 months.  Feeling fine, on/off slightly nauseated.  Slight cramping on/off. No bleeding or spotting.  No urinary sx's.  Breast tenderness a few weeks ago, now a bit better.    Lives by [place].  Will start PNV.      Fluoxetine- has been on it for awhile.  Feels great, feels good most days.  Bad days are fine.  Went on it ~3 yrs ago, hated her job, more crying.  Was not feeling well for about a year prior, building over time.  It was so helpful to help figure things out, feel better.  [employer] now- works out a ton, really helpful.  HIIT, rowing, strength training.  Life is better now in general.  Wasn't working out as  frequently prior.                4/9/2024   General Health   How would you rate your overall physical health? Excellent   Feel stress (tense, anxious, or unable to sleep) Only a little   (!) STRESS CONCERN      4/9/2024   Nutrition   Three or more servings of calcium each day? Yes   Diet: Regular (no restrictions)   How many servings of fruit and vegetables per day? 4 or more   How many sweetened beverages each day? 0-1         4/9/2024   Exercise   Days per week of moderate/strenous exercise 4 days   Average minutes spent exercising at this level 60 min         4/9/2024   Social Factors   Frequency of gathering with friends or relatives More than three times a week   Worry food won't last until get money to buy more No   Food not last or not have enough money for food? No   Do you have housing?  Yes   Are you worried about losing your housing? No   Lack of transportation? No   Unable to get utilities (heat,electricity)? No         4/9/2024   Dental   Dentist two times every year? Yes         4/9/2024   TB Screening   Were you born outside of the [place]? No       Today's PHQ-9 Score:       4/16/2024     9:06 AM   PHQ-9 SCORE   PHQ-9 Total Score MyChart 2 (Minimal depression)   PHQ-9 Total Score 2         4/9/2024   Substance Use   Alcohol more than 3/day or more than 7/wk No   Do you use any other substances recreationally? No     Social History     Tobacco Use    Smoking status: Never    Smokeless tobacco: Never   Vaping Use    Vaping status: Never Used   Substance Use Topics    Alcohol use: Yes     Comment: occ.     Drug use: Never             4/16/2024   Breast Cancer Screening   Family history of breast, colon, or ovarian cancer? No / Unknown      Mammogram Screening - Patient under 40 years of age: Routine Mammogram Screening not recommended.           4/9/2024   One time HIV Screening   Previous HIV test? No         4/9/2024   STI Screening   New sexual partner(s) since last STI/HIV test? No     History of  "abnormal Pap smear: NO - age 30-65 PAP every 5 years with negative HPV co-testing recommended        3/2/2021     9:49 AM   PAP / HPV   PAP (Historical) NIL            4/9/2024   Contraception/Family Planning   Questions about contraception or family planning (!) YES - see above        Reviewed and updated as needed this visit by Provider   Tobacco  Allergies  Meds  Problems  Med Hx  Surg Hx  Fam Hx              Past Medical History:   Diagnosis Date    Current moderate episode of major depressive disorder without prior episode (H) 1/17/2023     Past Surgical History:   Procedure Laterality Date    NO HISTORY OF SURGERY           Review of Systems  Constitutional, neuro, ENT, endocrine, pulmonary, cardiac, gastrointestinal, genitourinary, musculoskeletal, integument and psychiatric systems are negative, except as otherwise noted.     Objective    Exam  /77   Pulse 93   Temp 98.1  F (36.7  C) (Temporal)   Resp 16   Ht 1.691 m (5' 6.58\")   Wt 128.5 kg (283 lb 4.8 oz)   LMP 03/07/2024   SpO2 100%   Breastfeeding No   BMI 44.94 kg/m     Estimated body mass index is 44.94 kg/m  as calculated from the following:    Height as of this encounter: 1.691 m (5' 6.58\").    Weight as of this encounter: 128.5 kg (283 lb 4.8 oz).    Physical Exam  GENERAL: alert and no distress  EYES: Eyes grossly normal to inspection, PERRL and conjunctivae and sclerae normal  HENT: ear canals and TM's normal, nose and mouth without ulcers or lesions  NECK: no adenopathy, no asymmetry, masses, or scars  RESP: lungs clear to auscultation - no rales, rhonchi or wheezes  BREAST: normal without masses, tenderness or nipple discharge and no palpable axillary masses or adenopathy  CV: regular rate and rhythm, normal S1 S2, no S3 or S4, no murmur, click or rub, no peripheral edema  ABDOMEN: soft, nontender, no hepatosplenomegaly, no masses and bowel sounds normal   (female) w/bimanual: normal female external genitalia, normal " urethral meatus, normal vaginal mucosa, and normal cervix/adnexa/uterus without masses or discharge  MS: no gross musculoskeletal defects noted, no edema  SKIN: no suspicious lesions or rashes  NEURO: Normal strength and tone, mentation intact and speech normal  PSYCH: mentation appears normal, affect normal/bright        Signed Electronically by: Luz Maria Chiang MD

## 2024-04-16 NOTE — PROGRESS NOTES
Answers submitted by the patient for this visit:  Patient Health Questionnaire (Submitted on 4/16/2024)  If you checked off any problems, how difficult have these problems made it for you to do your work, take care of things at home, or get along with other people?: Not difficult at all  PHQ9 TOTAL SCORE: 2  ANN-7 (Submitted on 4/16/2024)  ANN 7 TOTAL SCORE: 0  Preventive Care Visit  [facility]  Luz Maria Chiang MD, Family Medicine  Apr 16, 2024  {Provider  Link to Equals6 :898308}    {PROVIDER CHARTING PREFERENCE:806615}    Daniela Frye is a 29 year old, presenting for the following:  Physical        4/16/2024     9:13 AM   Additional Questions   Roomed by jamie escalona   Accompanied by angel calvin         4/16/2024     9:13 AM   Patient Reported Additional Medications   Patient reports taking the following new medications n/a        Health Care Directive  Patient does not have a Health Care Directive or Living Will: {ADVANCE_DIRECTIVE_STATUS:494527}    HPI  ***  {MA/LPN/RN Pre-Provider Visit Orders- hCG/UA/Strep (Optional):922628}  {SUPERLIST (Optional):046370}  {additonal problems for provider to add (Optional):146870}      4/9/2024   General Health   How would you rate your overall physical health? Excellent   Feel stress (tense, anxious, or unable to sleep) Only a little   (!) STRESS CONCERN      4/9/2024   Nutrition   Three or more servings of calcium each day? Yes   Diet: Regular (no restrictions)   How many servings of fruit and vegetables per day? 4 or more   How many sweetened beverages each day? 0-1         4/9/2024   Exercise   Days per week of moderate/strenous exercise 4 days   Average minutes spent exercising at this level 60 min         4/9/2024   Social Factors   Frequency of gathering with friends or relatives More than three times a week   Worry food won't last until get money to buy more No   Food not last or not have enough money for food? No   Do you have housing?  Yes  "  Are you worried about losing your housing? No   Lack of transportation? No   Unable to get utilities (heat,electricity)? No         4/9/2024   Dental   Dentist two times every year? Yes         4/9/2024   TB Screening   Were you born outside of the [place]? No       Today's PHQ-9 Score:       4/16/2024     9:06 AM   PHQ-9 SCORE   PHQ-9 Total Score MyChart 2 (Minimal depression)   PHQ-9 Total Score 2         4/9/2024   Substance Use   Alcohol more than 3/day or more than 7/wk No   Do you use any other substances recreationally? No     Social History     Tobacco Use    Smoking status: Never    Smokeless tobacco: Never   Vaping Use    Vaping status: Never Used   Substance Use Topics    Alcohol use: Yes     Comment: occ.     Drug use: Never     {Provider  If there are gaps in the social history shown above, please follow the link to update and then refresh the note Link to Social and Substance History :992739}        4/16/2024   Breast Cancer Screening   Family history of breast, colon, or ovarian cancer? No / Unknown      {Mammogram Decision Support (Optional):453604}          4/9/2024   One time HIV Screening   Previous HIV test? No         4/9/2024   STI Screening   New sexual partner(s) since last STI/HIV test? No     History of abnormal Pap smear: { :362186}        3/2/2021     9:49 AM   PAP / HPV   PAP (Historical) NIL            4/9/2024   Contraception/Family Planning   Questions about contraception or family planning (!) YES ***     {Provider  Use the storyboard to review patient history, after sections have been marked as reviewed, refresh note to capture documentation:710375}   Reviewed and updated as needed this visit by Provider                    {HISTORY OPTIONS (Optional):452729}    {ROS Picklists (Optional):253768}     Objective    Exam  There were no vitals taken for this visit.   Estimated body mass index is 45.08 kg/m  as calculated from the following:    Height as of 1/17/23: 1.683 m (5' 6.25\").    " Weight as of 1/17/23: 127.6 kg (281 lb 6.4 oz).    Physical Exam  {Exam Choices (Optional):519257}        Signed Electronically by: Luz Maria Chiang MD  {Email feedback regarding this note to [email or web address]   :603428}Answers submitted by the patient for this visit:  Patient Health Questionnaire (Submitted on 4/16/2024)  If you checked off any problems, how difficult have these problems made it for you to do your work, take care of things at home, or get along with other people?: Not difficult at all  PHQ9 TOTAL SCORE: 2  ANN-7 (Submitted on 4/16/2024)  ANN 7 TOTAL SCORE: 0  Answers submitted by the patient for this visit:  Patient Health Questionnaire (Submitted on 4/16/2024)  If you checked off any problems, how difficult have these problems made it for you to do your work, take care of things at home, or get along with other people?: Not difficult at all  PHQ9 TOTAL SCORE: 2  ANN-7 (Submitted on 4/16/2024)  ANN 7 TOTAL SCORE: 0  Answers submitted by the patient for this visit:  Patient Health Questionnaire (Submitted on 4/16/2024)  If you checked off any problems, how difficult have these problems made it for you to do your work, take care of things at home, or get along with other people?: Not difficult at all  PHQ9 TOTAL SCORE: 2  ANN-7 (Submitted on 4/16/2024)  ANN 7 TOTAL SCORE: 0

## 2024-04-18 LAB
BKR LAB AP GYN ADEQUACY: NORMAL
BKR LAB AP GYN INTERPRETATION: NORMAL
BKR LAB AP HPV REFLEX: NORMAL
BKR LAB AP LMP: NORMAL
BKR LAB AP PREVIOUS ABNORMAL: NORMAL
PATH REPORT.COMMENTS IMP SPEC: NORMAL
PATH REPORT.COMMENTS IMP SPEC: NORMAL
PATH REPORT.RELEVANT HX SPEC: NORMAL

## 2024-04-19 ENCOUNTER — PATIENT OUTREACH (OUTPATIENT)
Dept: FAMILY MEDICINE | Facility: CLINIC | Age: 29
End: 2024-04-19
Payer: COMMERCIAL

## 2024-04-19 PROBLEM — R87.810 CERVICAL HIGH RISK HPV (HUMAN PAPILLOMAVIRUS) TEST POSITIVE: Status: ACTIVE | Noted: 2024-04-19

## 2024-04-19 LAB
HUMAN PAPILLOMA VIRUS 16 DNA: NEGATIVE
HUMAN PAPILLOMA VIRUS 18 DNA: NEGATIVE
HUMAN PAPILLOMA VIRUS FINAL DIAGNOSIS: ABNORMAL
HUMAN PAPILLOMA VIRUS OTHER HR: POSITIVE

## 2024-04-21 ENCOUNTER — OFFICE VISIT (OUTPATIENT)
Dept: URGENT CARE | Facility: URGENT CARE | Age: 29
End: 2024-04-21
Payer: COMMERCIAL

## 2024-04-21 ENCOUNTER — MYC MEDICAL ADVICE (OUTPATIENT)
Dept: FAMILY MEDICINE | Facility: CLINIC | Age: 29
End: 2024-04-21

## 2024-04-21 VITALS
DIASTOLIC BLOOD PRESSURE: 79 MMHG | BODY MASS INDEX: 44.89 KG/M2 | HEART RATE: 92 BPM | WEIGHT: 283 LBS | SYSTOLIC BLOOD PRESSURE: 134 MMHG | OXYGEN SATURATION: 100 % | TEMPERATURE: 97.9 F

## 2024-04-21 DIAGNOSIS — K11.1 PAROTID GLAND ENLARGEMENT: Primary | ICD-10-CM

## 2024-04-21 DIAGNOSIS — H65.91 OME (OTITIS MEDIA WITH EFFUSION), RIGHT: ICD-10-CM

## 2024-04-21 LAB
BASOPHILS # BLD AUTO: 0.1 10E3/UL (ref 0–0.2)
BASOPHILS NFR BLD AUTO: 1 %
EOSINOPHIL # BLD AUTO: 0.1 10E3/UL (ref 0–0.7)
EOSINOPHIL NFR BLD AUTO: 1 %
ERYTHROCYTE [DISTWIDTH] IN BLOOD BY AUTOMATED COUNT: 13.1 % (ref 10–15)
HCT VFR BLD AUTO: 35.9 % (ref 35–47)
HGB BLD-MCNC: 12.1 G/DL (ref 11.7–15.7)
IMM GRANULOCYTES # BLD: 0 10E3/UL
IMM GRANULOCYTES NFR BLD: 0 %
LYMPHOCYTES # BLD AUTO: 2 10E3/UL (ref 0.8–5.3)
LYMPHOCYTES NFR BLD AUTO: 19 %
MCH RBC QN AUTO: 29.7 PG (ref 26.5–33)
MCHC RBC AUTO-ENTMCNC: 33.7 G/DL (ref 31.5–36.5)
MCV RBC AUTO: 88 FL (ref 78–100)
MONOCYTES # BLD AUTO: 0.6 10E3/UL (ref 0–1.3)
MONOCYTES NFR BLD AUTO: 6 %
NEUTROPHILS # BLD AUTO: 7.6 10E3/UL (ref 1.6–8.3)
NEUTROPHILS NFR BLD AUTO: 73 %
PLATELET # BLD AUTO: 247 10E3/UL (ref 150–450)
RBC # BLD AUTO: 4.08 10E6/UL (ref 3.8–5.2)
WBC # BLD AUTO: 10.3 10E3/UL (ref 4–11)

## 2024-04-21 PROCEDURE — 99214 OFFICE O/P EST MOD 30 MIN: CPT | Performed by: NURSE PRACTITIONER

## 2024-04-21 PROCEDURE — 85025 COMPLETE CBC W/AUTO DIFF WBC: CPT | Performed by: NURSE PRACTITIONER

## 2024-04-21 PROCEDURE — 36415 COLL VENOUS BLD VENIPUNCTURE: CPT | Performed by: NURSE PRACTITIONER

## 2024-04-21 NOTE — PROGRESS NOTES
Chief Complaint   Patient presents with    Swelling     Of the right side of face and jaw x 2.5 days- patient is 6 weeks pregnant     SUBJECTIVE:  Melva Escobar is a 29 year old female presenting with right-sided facial redness swelling tenderness pain worsening in the last 3 days.  No fevers sweats chills earache sore throat exudate foul taste paresthesias rash dental pain.  She is 6 weeks pregnant.    Past Medical History:   Diagnosis Date    Current moderate episode of major depressive disorder without prior episode (H) 1/17/2023     Current Outpatient Medications   Medication Sig Dispense Refill    amoxicillin-clavulanate (AUGMENTIN) 875-125 MG tablet Take 1 tablet by mouth 2 times daily for 10 days 20 tablet 0     No current facility-administered medications for this visit.     Social History     Tobacco Use    Smoking status: Never    Smokeless tobacco: Never   Substance Use Topics    Alcohol use: Yes     Comment: occ.      No Known Allergies    Review of Systems  All systems negative except for those listed above in HPI.    OBJECTIVE:   /79 (BP Location: Right arm, Patient Position: Sitting, Cuff Size: Adult Large)   Pulse 92   Temp 97.9  F (36.6  C) (Temporal)   Wt 128.4 kg (283 lb)   LMP 03/07/2024   SpO2 100%   BMI 44.89 kg/m      Physical Exam  Vitals reviewed.   Constitutional:       General: She is not in acute distress.     Appearance: Normal appearance. She is well-developed. She is not ill-appearing, toxic-appearing or diaphoretic.   HENT:      Head: Normocephalic and atraumatic.        Ears:      Comments: Right TM is mildly erythematous and bulging.     Nose: Nose normal.      Mouth/Throat:      Pharynx: No oropharyngeal exudate or posterior oropharyngeal erythema.   Eyes:      Conjunctiva/sclera: Conjunctivae normal.      Pupils: Pupils are equal, round, and reactive to light.   Cardiovascular:      Rate and Rhythm: Normal rate.      Pulses: Normal pulses.   Pulmonary:      Effort:  Pulmonary effort is normal. No respiratory distress.   Musculoskeletal:         General: Normal range of motion.      Cervical back: Normal range of motion and neck supple.   Lymphadenopathy:      Cervical: No cervical adenopathy.   Skin:     General: Skin is warm.      Capillary Refill: Capillary refill takes less than 2 seconds.      Findings: Erythema present. No rash.   Neurological:      General: No focal deficit present.      Mental Status: She is alert and oriented to person, place, and time.   Psychiatric:         Mood and Affect: Mood normal.         Behavior: Behavior normal.       ASSESSMENT:    ICD-10-CM    1. Parotid gland enlargement  K11.1 CBC with platelets and differential     CBC with platelets and differential     amoxicillin-clavulanate (AUGMENTIN) 875-125 MG tablet      2. OME (otitis media with effusion), right  H65.91 amoxicillin-clavulanate (AUGMENTIN) 875-125 MG tablet          PLAN:     Parotitis bacterial viral cellulitis dental infection ear infection stone in the salivary gland.  Augmentin given severity  CBC normal  Reevaluation in 2-3 days at ER if severe worsening for possible CT IV atx  Apply moist heat- washcloth under hot water.  Eat tart foots as tolerated- lemon drops  Drink plenty of water.  Massage the gland as possible.  Follow up with primary care provider if you develop a fever or symptoms worsen.    Follow up with primary care provider with any problems, questions or concerns or if symptoms worsen or fail to improve. Patient agreed to plan and verbalized understanding.    Jessica Daley, [facility]

## 2024-04-21 NOTE — PATIENT INSTRUCTIONS
Sialolithiasis- stone in the salivary gland.  Parotitis bacterial viral cellulitis dental infection ear infection  Augmentin given severity  CBC normal  Reevaluation in 2-3 days at ER if severe worsening for possible CT IV atx  Apply moist heat- washcloth under hot water.  Eat tart foots as tolerated- lemon drops  Drink plenty of water.  Massage the gland as possible.  Follow up with primary care provider if you develop a fever or symptoms worsen.

## 2024-04-25 ENCOUNTER — VIRTUAL VISIT (OUTPATIENT)
Dept: OBGYN | Facility: CLINIC | Age: 29
End: 2024-04-25
Payer: COMMERCIAL

## 2024-04-25 ENCOUNTER — TRANSCRIBE ORDERS (OUTPATIENT)
Dept: MATERNAL FETAL MEDICINE | Facility: CLINIC | Age: 29
End: 2024-04-25

## 2024-04-25 ENCOUNTER — NURSE TRIAGE (OUTPATIENT)
Dept: NURSING | Facility: CLINIC | Age: 29
End: 2024-04-25

## 2024-04-25 VITALS — HEIGHT: 67 IN | BODY MASS INDEX: 44.99 KG/M2

## 2024-04-25 DIAGNOSIS — O26.90 PREGNANCY RELATED CONDITION, ANTEPARTUM: Primary | ICD-10-CM

## 2024-04-25 DIAGNOSIS — Z23 NEED FOR DIPHTHERIA-TETANUS-PERTUSSIS (TDAP) VACCINE: ICD-10-CM

## 2024-04-25 DIAGNOSIS — Z34.00 ENCOUNTER FOR SUPERVISION OF NORMAL FIRST PREGNANCY: Primary | ICD-10-CM

## 2024-04-25 PROCEDURE — 99207 PR NO CHARGE NURSE ONLY: CPT | Mod: 93
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
## 2024-04-26 ENCOUNTER — TELEPHONE (OUTPATIENT)
Dept: OBGYN | Facility: CLINIC | Age: 29
End: 2024-04-26

## 2024-04-26 ENCOUNTER — LAB (OUTPATIENT)
Dept: LAB | Facility: CLINIC | Age: 29
End: 2024-04-26
Payer: COMMERCIAL

## 2024-04-26 DIAGNOSIS — O20.9 BLEEDING IN EARLY PREGNANCY: ICD-10-CM

## 2024-04-26 DIAGNOSIS — O20.9 BLEEDING IN EARLY PREGNANCY: Primary | ICD-10-CM

## 2024-04-26 PROCEDURE — 84702 CHORIONIC GONADOTROPIN TEST: CPT

## 2024-04-26 PROCEDURE — 36415 COLL VENOUS BLD VENIPUNCTURE: CPT

## 2024-04-26 NOTE — TELEPHONE ENCOUNTER
"  OB Triage Call      Is patient's OB/Midwife with the [facility] or [facility]? [facility]- Proceed with triage     Reason for call: Patient reporting bleeding, spotting, clotting, cramping mild on/off.    Assessment: Denies dizziness, severe pain, bleeding is mild/spotting.    Plan: See provider within 24 hours.    Patient plans to deliver at      Patient's primary OB Provider is Dr. Fernandez.      Per protocol recommendations Patient to schedule follow up appointment within 24 hours.      Is patient's delivering hospital on divert? Does not apply due to Patient to schedule appointment       Vinita Nguyen RN  [facility]      7w0d    Estimated Date of Delivery: Dec 12, 2024        OB History    Para Term  AB Living   1 0 0 0 0 0   SAB IAB Ectopic Multiple Live Births   0 0 0 0 0      # Outcome Date GA Lbr Lei/2nd Weight Sex Type Anes PTL Lv   1 Current                No results found for: \"GBS\"       Vinita Nguyen RN   Reason for Disposition   [1] Intermittent lower abdominal pain (e.g., cramping) AND [2] present > 24 hours    Additional Information   Negative: Shock suspected (e.g., cold/pale/clammy skin, too weak to stand, low BP, rapid pulse)   Negative: Difficult to awaken or acting confused (e.g., disoriented, slurred speech)   Negative: Passed out (i.e., lost consciousness, collapsed and was not responding)   Negative: Sounds like a life-threatening emergency to the triager   Negative: SEVERE abdominal pain   Negative: SEVERE vaginal bleeding (e.g., soaking 2 pads per hour or large blood clots and present 2 or more hours)   Negative: SEVERE dizziness (e.g., unable to stand, requires support to walk, feels like passing out)   Negative: [1] MODERATE vaginal bleeding (e.g., soaking 1 pad per hour; clots) AND [2] present > 6 hours   Negative: [1] MODERATE vaginal bleeding (e.g., soaking 1 pad per hour; clots) AND [2] pregnant > 12 weeks   Negative: Passed tissue (e.g., " gray-white)   Negative: Shoulder pain   Negative: Pale skin (pallor) of new-onset or worsening   Negative: Patient sounds very sick or weak to the triager   Negative: [1] Constant abdominal pain AND [2] present > 2 hours   Negative: Fever 100.4 F (38.0 C) or higher    Protocols used: Pregnancy - Vaginal Bleeding Less Than 20 Weeks EGA-A-

## 2024-04-26 NOTE — TELEPHONE ENCOUNTER
7w1d  Spoke with FNA last evening about her bleeding in early pregnancy.  Was advised to call back if it got worse or did not improve.    Patient stated she is having heavy bleeding and clotting  Heavy period like bleeding with painful cramping and back pain  Passing clots and tissue    Discussed this does sound to be consistent with a miscarriage but for peace of mind we could do HCG testing.  Scheduled for HCG today 4/26 and Monday 4/29    Gave ED bleeding precautions.    Patient will call with any other questions or concerns.    Martita Melissa RN

## 2024-04-26 NOTE — CONFIDENTIAL NOTE
Caller reporting the following red-flag symptom(s): pregnant pt spoke with triage last night about bleeding, spotting, clotting, cramping mild on/off     Per the system red-flag symptom policy, patient was instructed to:  speak with a Registered Nurse    Action:  Patient warm transferred to a Registered Nurse

## 2024-04-27 LAB — HCG INTACT+B SERPL-ACNC: 1822 MIU/ML

## 2024-04-29 ENCOUNTER — LAB (OUTPATIENT)
Dept: LAB | Facility: CLINIC | Age: 29
End: 2024-04-29
Payer: COMMERCIAL

## 2024-04-29 DIAGNOSIS — O20.9 BLEEDING IN EARLY PREGNANCY: ICD-10-CM

## 2024-04-29 LAB — HCG INTACT+B SERPL-ACNC: 262 MIU/ML

## 2024-04-29 PROCEDURE — 84702 CHORIONIC GONADOTROPIN TEST: CPT

## 2024-04-29 PROCEDURE — 36415 COLL VENOUS BLD VENIPUNCTURE: CPT

## 2024-06-18 ENCOUNTER — MEDICAL CORRESPONDENCE (OUTPATIENT)
Dept: HEALTH INFORMATION MANAGEMENT | Facility: CLINIC | Age: 29
End: 2024-06-18
Payer: COMMERCIAL

## 2024-09-17 ENCOUNTER — LAB REQUISITION (OUTPATIENT)
Dept: LAB | Facility: CLINIC | Age: 29
End: 2024-09-17

## 2024-09-17 DIAGNOSIS — Z34.91 ENCOUNTER FOR SUPERVISION OF NORMAL PREGNANCY, UNSPECIFIED, FIRST TRIMESTER: ICD-10-CM

## 2024-09-17 LAB
ABO/RH(D): NORMAL
ANTIBODY SCREEN: NEGATIVE
BASOPHILS # BLD AUTO: 0 10E3/UL (ref 0–0.2)
BASOPHILS NFR BLD AUTO: 0 %
EOSINOPHIL # BLD AUTO: 0.1 10E3/UL (ref 0–0.7)
EOSINOPHIL NFR BLD AUTO: 1 %
ERYTHROCYTE [DISTWIDTH] IN BLOOD BY AUTOMATED COUNT: 13.8 % (ref 10–15)
HBA1C MFR BLD: 5.7 %
HBV SURFACE AG SERPL QL IA: NONREACTIVE
HCT VFR BLD AUTO: 39.1 % (ref 35–47)
HGB BLD-MCNC: 13 G/DL (ref 11.7–15.7)
HIV 1+2 AB+HIV1 P24 AG SERPL QL IA: NONREACTIVE
IMM GRANULOCYTES # BLD: 0 10E3/UL
IMM GRANULOCYTES NFR BLD: 0 %
LYMPHOCYTES # BLD AUTO: 2 10E3/UL (ref 0.8–5.3)
LYMPHOCYTES NFR BLD AUTO: 21 %
MCH RBC QN AUTO: 29.1 PG (ref 26.5–33)
MCHC RBC AUTO-ENTMCNC: 33.2 G/DL (ref 31.5–36.5)
MCV RBC AUTO: 88 FL (ref 78–100)
MONOCYTES # BLD AUTO: 0.5 10E3/UL (ref 0–1.3)
MONOCYTES NFR BLD AUTO: 6 %
NEUTROPHILS # BLD AUTO: 6.7 10E3/UL (ref 1.6–8.3)
NEUTROPHILS NFR BLD AUTO: 72 %
NRBC # BLD AUTO: 0 10E3/UL
NRBC BLD AUTO-RTO: 0 /100
PLATELET # BLD AUTO: 265 10E3/UL (ref 150–450)
RBC # BLD AUTO: 4.47 10E6/UL (ref 3.8–5.2)
SPECIMEN EXPIRATION DATE: NORMAL
WBC # BLD AUTO: 9.3 10E3/UL (ref 4–11)

## 2024-09-17 PROCEDURE — 87086 URINE CULTURE/COLONY COUNT: CPT | Performed by: OBSTETRICS & GYNECOLOGY

## 2024-09-17 PROCEDURE — 86762 RUBELLA ANTIBODY: CPT | Performed by: OBSTETRICS & GYNECOLOGY

## 2024-09-17 PROCEDURE — 86900 BLOOD TYPING SEROLOGIC ABO: CPT | Performed by: OBSTETRICS & GYNECOLOGY

## 2024-09-17 PROCEDURE — 83036 HEMOGLOBIN GLYCOSYLATED A1C: CPT | Performed by: OBSTETRICS & GYNECOLOGY

## 2024-09-17 PROCEDURE — 87340 HEPATITIS B SURFACE AG IA: CPT | Performed by: OBSTETRICS & GYNECOLOGY

## 2024-09-17 PROCEDURE — 87491 CHLMYD TRACH DNA AMP PROBE: CPT | Performed by: OBSTETRICS & GYNECOLOGY

## 2024-09-17 PROCEDURE — 85025 COMPLETE CBC W/AUTO DIFF WBC: CPT | Performed by: OBSTETRICS & GYNECOLOGY

## 2024-09-17 PROCEDURE — 87389 HIV-1 AG W/HIV-1&-2 AB AG IA: CPT | Performed by: OBSTETRICS & GYNECOLOGY

## 2024-09-18 LAB
BACTERIA UR CULT: NORMAL
C TRACH DNA SPEC QL PROBE+SIG AMP: NEGATIVE
N GONORRHOEA DNA SPEC QL NAA+PROBE: NEGATIVE
RUBV IGG SERPL QL IA: 4.33 INDEX
RUBV IGG SERPL QL IA: POSITIVE

## 2024-10-18 ENCOUNTER — MEDICAL CORRESPONDENCE (OUTPATIENT)
Dept: HEALTH INFORMATION MANAGEMENT | Facility: CLINIC | Age: 29
End: 2024-10-18
Payer: COMMERCIAL

## 2024-10-18 ENCOUNTER — TRANSFERRED RECORDS (OUTPATIENT)
Dept: HEALTH INFORMATION MANAGEMENT | Facility: CLINIC | Age: 29
End: 2024-10-18
Payer: COMMERCIAL

## 2024-10-21 ENCOUNTER — TRANSCRIBE ORDERS (OUTPATIENT)
Dept: MATERNAL FETAL MEDICINE | Facility: CLINIC | Age: 29
End: 2024-10-21
Payer: COMMERCIAL

## 2024-10-21 DIAGNOSIS — O26.90 PREGNANCY RELATED CONDITION, ANTEPARTUM: Primary | ICD-10-CM

## 2024-11-25 ENCOUNTER — PRE VISIT (OUTPATIENT)
Dept: MATERNAL FETAL MEDICINE | Facility: CLINIC | Age: 29
End: 2024-11-25
Payer: COMMERCIAL

## 2024-12-02 ENCOUNTER — HOSPITAL ENCOUNTER (OUTPATIENT)
Dept: ULTRASOUND IMAGING | Facility: CLINIC | Age: 29
Discharge: HOME | End: 2024-12-02
Attending: STUDENT IN AN ORGANIZED HEALTH CARE EDUCATION/TRAINING PROGRAM
Payer: COMMERCIAL

## 2024-12-02 ENCOUNTER — OFFICE VISIT (OUTPATIENT)
Dept: MATERNAL FETAL MEDICINE | Facility: CLINIC | Age: 29
End: 2024-12-02
Attending: STUDENT IN AN ORGANIZED HEALTH CARE EDUCATION/TRAINING PROGRAM
Payer: COMMERCIAL

## 2024-12-02 DIAGNOSIS — O26.90 PREGNANCY RELATED CONDITION, ANTEPARTUM: ICD-10-CM

## 2024-12-02 DIAGNOSIS — E66.01 OBESITY, CLASS III, BMI 40-49.9 (MORBID OBESITY) (H): ICD-10-CM

## 2024-12-02 DIAGNOSIS — Z36.89 ENCOUNTER FOR FETAL ANATOMIC SURVEY: ICD-10-CM

## 2024-12-02 DIAGNOSIS — Z36.2 ENCOUNTER FOR FOLLOW-UP ULTRASOUND OF FETAL ANATOMY: Primary | ICD-10-CM

## 2024-12-02 PROCEDURE — 99203 OFFICE O/P NEW LOW 30 MIN: CPT | Mod: 25 | Performed by: STUDENT IN AN ORGANIZED HEALTH CARE EDUCATION/TRAINING PROGRAM

## 2024-12-02 PROCEDURE — 76811 OB US DETAILED SNGL FETUS: CPT | Mod: 26 | Performed by: STUDENT IN AN ORGANIZED HEALTH CARE EDUCATION/TRAINING PROGRAM

## 2024-12-02 PROCEDURE — 76811 OB US DETAILED SNGL FETUS: CPT

## 2024-12-02 NOTE — NURSING NOTE
Patient presents to [facility] for L2 at 19w1d due to BMI>40. Denies LOF, vaginal bleeding, cramping/contractions. SBAR given to JASBIR MD, see their note in Epic.

## 2024-12-02 NOTE — PROGRESS NOTES
The patient was seen for an ultrasound in the [facility] today.  For a detailed report of the ultrasound examination, please see the ultrasound report which can be found under the imaging tab.     If you have questions regarding today's evaluation or if we can be of further service, please contact the Maternal-Fetal Medicine Center.    Barb Skinner MD  Maternal Fetal Medicine

## 2024-12-16 ENCOUNTER — TRANSFERRED RECORDS (OUTPATIENT)
Dept: HEALTH INFORMATION MANAGEMENT | Facility: CLINIC | Age: 29
End: 2024-12-16
Payer: COMMERCIAL

## 2024-12-30 ENCOUNTER — OFFICE VISIT (OUTPATIENT)
Dept: MATERNAL FETAL MEDICINE | Facility: CLINIC | Age: 29
End: 2024-12-30
Attending: OBSTETRICS & GYNECOLOGY
Payer: COMMERCIAL

## 2024-12-30 ENCOUNTER — HOSPITAL ENCOUNTER (OUTPATIENT)
Dept: ULTRASOUND IMAGING | Facility: CLINIC | Age: 29
Discharge: HOME | End: 2024-12-30
Attending: OBSTETRICS & GYNECOLOGY
Payer: COMMERCIAL

## 2024-12-30 DIAGNOSIS — E66.01 OBESITY, CLASS III, BMI 40-49.9 (MORBID OBESITY) (H): ICD-10-CM

## 2024-12-30 DIAGNOSIS — Z36.2 ENCOUNTER FOR FOLLOW-UP ULTRASOUND OF FETAL ANATOMY: ICD-10-CM

## 2024-12-30 DIAGNOSIS — O99.210 OBESITY IN PREGNANCY, ANTEPARTUM: Primary | ICD-10-CM

## 2024-12-30 PROCEDURE — 76816 OB US FOLLOW-UP PER FETUS: CPT

## 2024-12-30 NOTE — NURSING NOTE
Patient reports positive fetal movement, denies pain, denies contractions/pre-term labor, leaking of fluid, or bleeding. Patient denies headache, visual changes, nausea/vomiting, epigastric pain related to preeclampsia. Education provided to patient on RL2. SBAR given to JASBIR NAVARRO, see their note in Epic.    Ruth Gandara RN

## 2024-12-30 NOTE — PROGRESS NOTES
The patient was seen for an ultrasound in the M[facility]etal Medicine Center at the [facility] today.  For a detailed report of the ultrasound examination, please see the ultrasound report which can be found under the imaging tab.    If you have questions regarding today's evaluation or if we can be of further service, please contact the Maternal-Fetal Medicine Center.    Krystal Torres MD  , OB/GYN  Maternal-Fetal Medicine  [phone] (Pager)

## 2025-02-04 ENCOUNTER — LAB REQUISITION (OUTPATIENT)
Dept: LAB | Facility: CLINIC | Age: 30
End: 2025-02-04

## 2025-02-04 DIAGNOSIS — Z67.11 TYPE A BLOOD, RH NEGATIVE: ICD-10-CM

## 2025-02-04 DIAGNOSIS — Z36.89 ENCOUNTER FOR OTHER SPECIFIED ANTENATAL SCREENING: ICD-10-CM

## 2025-02-04 LAB
ABO + RH BLD: NORMAL
BLD GP AB SCN SERPL QL: NEGATIVE
ERYTHROCYTE [DISTWIDTH] IN BLOOD BY AUTOMATED COUNT: 13.7 % (ref 10–15)
HCT VFR BLD AUTO: 30.9 % (ref 35–47)
HGB BLD-MCNC: 9.9 G/DL (ref 11.7–15.7)
MCH RBC QN AUTO: 27.7 PG (ref 26.5–33)
MCHC RBC AUTO-ENTMCNC: 32 G/DL (ref 31.5–36.5)
MCV RBC AUTO: 86 FL (ref 78–100)
PLATELET # BLD AUTO: 240 10E3/UL (ref 150–450)
RBC # BLD AUTO: 3.58 10E6/UL (ref 3.8–5.2)
SPECIMEN EXP DATE BLD: NORMAL
SPECIMEN EXP DATE BLD: NORMAL
WBC # BLD AUTO: 10.1 10E3/UL (ref 4–11)

## 2025-02-04 PROCEDURE — 86900 BLOOD TYPING SEROLOGIC ABO: CPT

## 2025-02-04 PROCEDURE — 86592 SYPHILIS TEST NON-TREP QUAL: CPT

## 2025-02-04 PROCEDURE — 85048 AUTOMATED LEUKOCYTE COUNT: CPT

## 2025-02-04 PROCEDURE — 85018 HEMOGLOBIN: CPT

## 2025-02-04 PROCEDURE — 86850 RBC ANTIBODY SCREEN: CPT

## 2025-02-05 LAB — RPR SER QL: NONREACTIVE

## 2025-03-11 ENCOUNTER — TRANSFERRED RECORDS (OUTPATIENT)
Dept: HEALTH INFORMATION MANAGEMENT | Facility: CLINIC | Age: 30
End: 2025-03-11
Payer: COMMERCIAL

## 2025-03-25 ENCOUNTER — LAB REQUISITION (OUTPATIENT)
Dept: LAB | Facility: CLINIC | Age: 30
End: 2025-03-25

## 2025-03-25 DIAGNOSIS — R03.0 ELEVATED BLOOD-PRESSURE READING, WITHOUT DIAGNOSIS OF HYPERTENSION: ICD-10-CM

## 2025-03-25 LAB
ALBUMIN MFR UR ELPH: 9.5 MG/DL
ALT SERPL W P-5'-P-CCNC: 15 U/L (ref 0–50)
AST SERPL W P-5'-P-CCNC: 15 U/L (ref 0–45)
BASOPHILS # BLD AUTO: 0 10E3/UL (ref 0–0.2)
BASOPHILS NFR BLD AUTO: 0 %
CREAT SERPL-MCNC: 0.61 MG/DL (ref 0.51–0.95)
CREAT UR-MCNC: 85.8 MG/DL
EGFRCR SERPLBLD CKD-EPI 2021: >90 ML/MIN/1.73M2
EOSINOPHIL # BLD AUTO: 0 10E3/UL (ref 0–0.7)
EOSINOPHIL NFR BLD AUTO: 0 %
ERYTHROCYTE [DISTWIDTH] IN BLOOD BY AUTOMATED COUNT: 14.5 % (ref 10–15)
HCT VFR BLD AUTO: 33.2 % (ref 35–47)
HGB BLD-MCNC: 10.5 G/DL (ref 11.7–15.7)
IMM GRANULOCYTES # BLD: 0.1 10E3/UL
IMM GRANULOCYTES NFR BLD: 1 %
LYMPHOCYTES # BLD AUTO: 1.5 10E3/UL (ref 0.8–5.3)
LYMPHOCYTES NFR BLD AUTO: 13 %
MCH RBC QN AUTO: 26.3 PG (ref 26.5–33)
MCHC RBC AUTO-ENTMCNC: 31.6 G/DL (ref 31.5–36.5)
MCV RBC AUTO: 83 FL (ref 78–100)
MONOCYTES # BLD AUTO: 0.4 10E3/UL (ref 0–1.3)
MONOCYTES NFR BLD AUTO: 4 %
NEUTROPHILS # BLD AUTO: 9.6 10E3/UL (ref 1.6–8.3)
NEUTROPHILS NFR BLD AUTO: 83 %
NRBC # BLD AUTO: 0 10E3/UL
NRBC BLD AUTO-RTO: 0 /100
PLATELET # BLD AUTO: 277 10E3/UL (ref 150–450)
PROT/CREAT 24H UR: 0.11 MG/MG CR (ref 0–0.2)
RBC # BLD AUTO: 4 10E6/UL (ref 3.8–5.2)
WBC # BLD AUTO: 11.6 10E3/UL (ref 4–11)

## 2025-03-25 PROCEDURE — 84460 ALANINE AMINO (ALT) (SGPT): CPT | Performed by: OBSTETRICS & GYNECOLOGY

## 2025-03-25 PROCEDURE — 85004 AUTOMATED DIFF WBC COUNT: CPT | Performed by: OBSTETRICS & GYNECOLOGY

## 2025-03-25 PROCEDURE — 82565 ASSAY OF CREATININE: CPT | Performed by: OBSTETRICS & GYNECOLOGY

## 2025-03-25 PROCEDURE — 84156 ASSAY OF PROTEIN URINE: CPT | Performed by: OBSTETRICS & GYNECOLOGY

## 2025-03-25 PROCEDURE — 85048 AUTOMATED LEUKOCYTE COUNT: CPT | Performed by: OBSTETRICS & GYNECOLOGY

## 2025-03-25 PROCEDURE — 84450 TRANSFERASE (AST) (SGOT): CPT | Performed by: OBSTETRICS & GYNECOLOGY

## 2025-04-01 ENCOUNTER — LAB REQUISITION (OUTPATIENT)
Dept: LAB | Facility: CLINIC | Age: 30
End: 2025-04-01

## 2025-04-01 DIAGNOSIS — O16.3 UNSPECIFIED MATERNAL HYPERTENSION, THIRD TRIMESTER: ICD-10-CM

## 2025-04-01 LAB
ALBUMIN MFR UR ELPH: 16.2 MG/DL
CREAT UR-MCNC: 158 MG/DL
ERYTHROCYTE [DISTWIDTH] IN BLOOD BY AUTOMATED COUNT: 14.4 % (ref 10–15)
HCT VFR BLD AUTO: 34.1 % (ref 35–47)
HGB BLD-MCNC: 10.9 G/DL (ref 11.7–15.7)
MCH RBC QN AUTO: 26.7 PG (ref 26.5–33)
MCHC RBC AUTO-ENTMCNC: 32 G/DL (ref 31.5–36.5)
MCV RBC AUTO: 84 FL (ref 78–100)
PLATELET # BLD AUTO: 285 10E3/UL (ref 150–450)
PROT/CREAT 24H UR: 0.1 MG/MG CR (ref 0–0.2)
RBC # BLD AUTO: 4.08 10E6/UL (ref 3.8–5.2)
WBC # BLD AUTO: 13.4 10E3/UL (ref 4–11)

## 2025-04-01 PROCEDURE — 84450 TRANSFERASE (AST) (SGOT): CPT | Performed by: OBSTETRICS & GYNECOLOGY

## 2025-04-01 PROCEDURE — 84460 ALANINE AMINO (ALT) (SGPT): CPT | Performed by: OBSTETRICS & GYNECOLOGY

## 2025-04-01 PROCEDURE — 84156 ASSAY OF PROTEIN URINE: CPT | Performed by: OBSTETRICS & GYNECOLOGY

## 2025-04-01 PROCEDURE — 85018 HEMOGLOBIN: CPT | Performed by: OBSTETRICS & GYNECOLOGY

## 2025-04-02 LAB
ALT SERPL W P-5'-P-CCNC: 15 U/L (ref 0–50)
AST SERPL W P-5'-P-CCNC: 15 U/L (ref 0–45)

## 2025-04-03 ENCOUNTER — PATIENT OUTREACH (OUTPATIENT)
Dept: FAMILY MEDICINE | Facility: CLINIC | Age: 30
End: 2025-04-03
Payer: COMMERCIAL

## 2025-04-03 DIAGNOSIS — R87.810 CERVICAL HIGH RISK HPV (HUMAN PAPILLOMAVIRUS) TEST POSITIVE: Primary | ICD-10-CM

## 2025-04-04 ENCOUNTER — LAB REQUISITION (OUTPATIENT)
Dept: LAB | Facility: CLINIC | Age: 30
End: 2025-04-04
Payer: COMMERCIAL

## 2025-04-04 DIAGNOSIS — Z3A.36 36 WEEKS GESTATION OF PREGNANCY: ICD-10-CM

## 2025-04-04 PROCEDURE — 87653 STREP B DNA AMP PROBE: CPT | Mod: ORL | Performed by: OBSTETRICS & GYNECOLOGY

## 2025-04-05 LAB — GP B STREP DNA SPEC QL NAA+PROBE: NEGATIVE

## 2025-04-08 DIAGNOSIS — Z00.00 LABORATORY EXAMINATION ORDERED AS PART OF A ROUTINE GENERAL MEDICAL EXAMINATION: Primary | ICD-10-CM

## 2025-04-19 LAB
ABO + RH BLD: ABNORMAL
BLD GP AB SCN SERPL QL: POSITIVE
SPECIMEN EXP DATE BLD: ABNORMAL

## 2025-04-20 ENCOUNTER — LAB (OUTPATIENT)
Dept: LAB | Facility: CLINIC | Age: 30
End: 2025-04-20
Payer: COMMERCIAL

## 2025-04-20 DIAGNOSIS — Z00.00 LABORATORY EXAMINATION ORDERED AS PART OF A ROUTINE GENERAL MEDICAL EXAMINATION: ICD-10-CM

## 2025-04-20 LAB
BLD GP AB INVEST PLASRBC-IMP: NORMAL
HGB BLD-MCNC: 11.4 G/DL (ref 11.7–15.7)
PLATELET # BLD AUTO: 277 10E3/UL (ref 150–450)
SPECIMEN EXP DATE BLD: NORMAL

## 2025-04-20 PROCEDURE — 85049 AUTOMATED PLATELET COUNT: CPT

## 2025-04-20 PROCEDURE — 85018 HEMOGLOBIN: CPT

## 2025-04-20 PROCEDURE — 86780 TREPONEMA PALLIDUM: CPT

## 2025-04-20 PROCEDURE — 86920 COMPATIBILITY TEST SPIN: CPT | Performed by: OBSTETRICS & GYNECOLOGY

## 2025-04-20 PROCEDURE — 86900 BLOOD TYPING SEROLOGIC ABO: CPT

## 2025-04-20 PROCEDURE — 86870 RBC ANTIBODY IDENTIFICATION: CPT

## 2025-04-20 PROCEDURE — 36415 COLL VENOUS BLD VENIPUNCTURE: CPT

## 2025-04-21 ENCOUNTER — ANESTHESIA (OUTPATIENT)
Dept: OBGYN | Facility: CLINIC | Age: 30
End: 2025-04-21
Payer: COMMERCIAL

## 2025-04-21 ENCOUNTER — ANESTHESIA EVENT (OUTPATIENT)
Dept: OBGYN | Facility: CLINIC | Age: 30
End: 2025-04-21
Payer: COMMERCIAL

## 2025-04-21 ENCOUNTER — HOSPITAL ENCOUNTER (INPATIENT)
Facility: CLINIC | Age: 30
LOS: 3 days | Discharge: HOME | End: 2025-04-24
Attending: OBSTETRICS & GYNECOLOGY | Admitting: OBSTETRICS & GYNECOLOGY
Payer: COMMERCIAL

## 2025-04-21 DIAGNOSIS — Z98.891 S/P CESAREAN SECTION: Primary | ICD-10-CM

## 2025-04-21 DIAGNOSIS — Z98.891 S/P C-SECTION: ICD-10-CM

## 2025-04-21 LAB
BLD PROD TYP BPU: NORMAL
BLD PROD TYP BPU: NORMAL
BLOOD COMPONENT TYPE: NORMAL
BLOOD COMPONENT TYPE: NORMAL
CODING SYSTEM: NORMAL
CODING SYSTEM: NORMAL
CROSSMATCH: NORMAL
CROSSMATCH: NORMAL
GLUCOSE BLDC GLUCOMTR-MCNC: 79 MG/DL (ref 70–99)
T PALLIDUM AB SER QL: NONREACTIVE
UNIT ABO/RH: NORMAL
UNIT ABO/RH: NORMAL
UNIT NUMBER: NORMAL
UNIT NUMBER: NORMAL
UNIT STATUS: NORMAL
UNIT STATUS: NORMAL
UNIT TYPE ISBT: 600
UNIT TYPE ISBT: 600

## 2025-04-21 PROCEDURE — 258N000003 HC RX IP 258 OP 636: Performed by: ANESTHESIOLOGY

## 2025-04-21 PROCEDURE — 250N000011 HC RX IP 250 OP 636: Performed by: ANESTHESIOLOGY

## 2025-04-21 PROCEDURE — 250N000009 HC RX 250: Performed by: ANESTHESIOLOGY

## 2025-04-21 PROCEDURE — 250N000011 HC RX IP 250 OP 636: Performed by: OBSTETRICS & GYNECOLOGY

## 2025-04-21 PROCEDURE — 710N000009 HC RECOVERY PHASE 1, LEVEL 1, PER MIN: Performed by: OBSTETRICS & GYNECOLOGY

## 2025-04-21 PROCEDURE — 250N000013 HC RX MED GY IP 250 OP 250 PS 637: Performed by: OBSTETRICS & GYNECOLOGY

## 2025-04-21 PROCEDURE — 272N000001 HC OR GENERAL SUPPLY STERILE: Performed by: OBSTETRICS & GYNECOLOGY

## 2025-04-21 PROCEDURE — 258N000003 HC RX IP 258 OP 636: Performed by: OBSTETRICS & GYNECOLOGY

## 2025-04-21 PROCEDURE — 250N000009 HC RX 250: Performed by: OBSTETRICS & GYNECOLOGY

## 2025-04-21 PROCEDURE — 120N000012 HC R&B POSTPARTUM

## 2025-04-21 PROCEDURE — 360N000076 HC SURGERY LEVEL 3, PER MIN: Performed by: OBSTETRICS & GYNECOLOGY

## 2025-04-21 PROCEDURE — 370N000017 HC ANESTHESIA TECHNICAL FEE, PER MIN: Performed by: OBSTETRICS & GYNECOLOGY

## 2025-04-21 PROCEDURE — 999N000016 HC STATISTIC ATTENDANCE AT DELIVERY

## 2025-04-21 RX ORDER — CEFAZOLIN SODIUM/WATER 3 G/30 ML
3 SYRINGE (ML) INTRAVENOUS SEE ADMIN INSTRUCTIONS
Status: DISCONTINUED | OUTPATIENT
Start: 2025-04-21 | End: 2025-04-21 | Stop reason: HOSPADM

## 2025-04-21 RX ORDER — SODIUM CHLORIDE, SODIUM LACTATE, POTASSIUM CHLORIDE, CALCIUM CHLORIDE 600; 310; 30; 20 MG/100ML; MG/100ML; MG/100ML; MG/100ML
INJECTION, SOLUTION INTRAVENOUS CONTINUOUS
Status: DISCONTINUED | OUTPATIENT
Start: 2025-04-21 | End: 2025-04-21 | Stop reason: HOSPADM

## 2025-04-21 RX ORDER — IBUPROFEN 400 MG/1
800 TABLET, FILM COATED ORAL EVERY 6 HOURS
Status: DISCONTINUED | OUTPATIENT
Start: 2025-04-22 | End: 2025-04-24 | Stop reason: HOSPADM

## 2025-04-21 RX ORDER — OXYTOCIN 10 [USP'U]/ML
10 INJECTION, SOLUTION INTRAMUSCULAR; INTRAVENOUS
Status: DISCONTINUED | OUTPATIENT
Start: 2025-04-21 | End: 2025-04-21 | Stop reason: HOSPADM

## 2025-04-21 RX ORDER — LIDOCAINE 40 MG/G
CREAM TOPICAL
Status: DISCONTINUED | OUTPATIENT
Start: 2025-04-21 | End: 2025-04-21 | Stop reason: HOSPADM

## 2025-04-21 RX ORDER — DEXTROSE MONOHYDRATE 25 G/50ML
25-50 INJECTION, SOLUTION INTRAVENOUS
Status: DISCONTINUED | OUTPATIENT
Start: 2025-04-21 | End: 2025-04-24 | Stop reason: HOSPADM

## 2025-04-21 RX ORDER — METOCLOPRAMIDE 10 MG/1
10 TABLET ORAL EVERY 6 HOURS PRN
Status: DISCONTINUED | OUTPATIENT
Start: 2025-04-21 | End: 2025-04-24 | Stop reason: HOSPADM

## 2025-04-21 RX ORDER — LOPERAMIDE HYDROCHLORIDE 2 MG/1
2 CAPSULE ORAL
Status: DISCONTINUED | OUTPATIENT
Start: 2025-04-21 | End: 2025-04-21 | Stop reason: HOSPADM

## 2025-04-21 RX ORDER — NICOTINE POLACRILEX 4 MG
15-30 LOZENGE BUCCAL
Status: DISCONTINUED | OUTPATIENT
Start: 2025-04-21 | End: 2025-04-24 | Stop reason: HOSPADM

## 2025-04-21 RX ORDER — SODIUM PHOSPHATE,MONO-DIBASIC 19G-7G/118
1 ENEMA (ML) RECTAL DAILY PRN
Status: DISCONTINUED | OUTPATIENT
Start: 2025-04-23 | End: 2025-04-24 | Stop reason: HOSPADM

## 2025-04-21 RX ORDER — ASPIRIN 81 MG/1
81 TABLET ORAL DAILY
Status: ON HOLD | COMMUNITY
End: 2025-04-24

## 2025-04-21 RX ORDER — MISOPROSTOL 200 UG/1
800 TABLET ORAL
Status: DISCONTINUED | OUTPATIENT
Start: 2025-04-21 | End: 2025-04-24 | Stop reason: HOSPADM

## 2025-04-21 RX ORDER — BUPIVACAINE HYDROCHLORIDE 7.5 MG/ML
INJECTION, SOLUTION INTRASPINAL PRN
Status: DISCONTINUED | OUTPATIENT
Start: 2025-04-21 | End: 2025-04-21

## 2025-04-21 RX ORDER — SIMETHICONE 80 MG
80 TABLET,CHEWABLE ORAL 4 TIMES DAILY PRN
Status: DISCONTINUED | OUTPATIENT
Start: 2025-04-21 | End: 2025-04-24 | Stop reason: HOSPADM

## 2025-04-21 RX ORDER — ACETAMINOPHEN 325 MG/1
975 TABLET ORAL ONCE
Status: COMPLETED | OUTPATIENT
Start: 2025-04-21 | End: 2025-04-21

## 2025-04-21 RX ORDER — OXYTOCIN 10 [USP'U]/ML
10 INJECTION, SOLUTION INTRAMUSCULAR; INTRAVENOUS
Status: DISCONTINUED | OUTPATIENT
Start: 2025-04-21 | End: 2025-04-24 | Stop reason: HOSPADM

## 2025-04-21 RX ORDER — TRANEXAMIC ACID 10 MG/ML
1 INJECTION, SOLUTION INTRAVENOUS EVERY 30 MIN PRN
Status: DISCONTINUED | OUTPATIENT
Start: 2025-04-21 | End: 2025-04-24 | Stop reason: HOSPADM

## 2025-04-21 RX ORDER — METHYLERGONOVINE MALEATE 0.2 MG/ML
200 INJECTION INTRAVENOUS
Status: DISCONTINUED | OUTPATIENT
Start: 2025-04-21 | End: 2025-04-21 | Stop reason: HOSPADM

## 2025-04-21 RX ORDER — CEFAZOLIN SODIUM/WATER 3 G/30 ML
SYRINGE (ML) INTRAVENOUS
Status: DISCONTINUED
Start: 2025-04-21 | End: 2025-04-21 | Stop reason: HOSPADM

## 2025-04-21 RX ORDER — CEFAZOLIN SODIUM/WATER 3 G/30 ML
3 SYRINGE (ML) INTRAVENOUS
Status: COMPLETED | OUTPATIENT
Start: 2025-04-21 | End: 2025-04-21

## 2025-04-21 RX ORDER — NALOXONE HYDROCHLORIDE 0.4 MG/ML
0.2 INJECTION, SOLUTION INTRAMUSCULAR; INTRAVENOUS; SUBCUTANEOUS
Status: DISCONTINUED | OUTPATIENT
Start: 2025-04-21 | End: 2025-04-24 | Stop reason: HOSPADM

## 2025-04-21 RX ORDER — NALOXONE HYDROCHLORIDE 0.4 MG/ML
0.4 INJECTION, SOLUTION INTRAMUSCULAR; INTRAVENOUS; SUBCUTANEOUS
Status: DISCONTINUED | OUTPATIENT
Start: 2025-04-21 | End: 2025-04-24 | Stop reason: HOSPADM

## 2025-04-21 RX ORDER — METOCLOPRAMIDE 10 MG/1
10 TABLET ORAL EVERY 6 HOURS PRN
Status: DISCONTINUED | OUTPATIENT
Start: 2025-04-21 | End: 2025-04-21 | Stop reason: HOSPADM

## 2025-04-21 RX ORDER — FLUOXETINE 10 MG/1
20 CAPSULE ORAL DAILY
Status: ON HOLD | COMMUNITY
End: 2025-04-21

## 2025-04-21 RX ORDER — KETOROLAC TROMETHAMINE 15 MG/ML
15 INJECTION, SOLUTION INTRAMUSCULAR; INTRAVENOUS EVERY 6 HOURS
Status: COMPLETED | OUTPATIENT
Start: 2025-04-21 | End: 2025-04-22

## 2025-04-21 RX ORDER — PROCHLORPERAZINE MALEATE 5 MG/1
10 TABLET ORAL EVERY 6 HOURS PRN
Status: DISCONTINUED | OUTPATIENT
Start: 2025-04-21 | End: 2025-04-21 | Stop reason: HOSPADM

## 2025-04-21 RX ORDER — BISACODYL 10 MG
10 SUPPOSITORY, RECTAL RECTAL DAILY PRN
Status: DISCONTINUED | OUTPATIENT
Start: 2025-04-23 | End: 2025-04-24 | Stop reason: HOSPADM

## 2025-04-21 RX ORDER — ACETAMINOPHEN 325 MG/1
TABLET ORAL
Status: COMPLETED
Start: 2025-04-21 | End: 2025-04-21

## 2025-04-21 RX ORDER — ONDANSETRON 4 MG/1
4 TABLET, ORALLY DISINTEGRATING ORAL EVERY 6 HOURS PRN
Status: DISCONTINUED | OUTPATIENT
Start: 2025-04-21 | End: 2025-04-24 | Stop reason: HOSPADM

## 2025-04-21 RX ORDER — MISOPROSTOL 200 UG/1
400 TABLET ORAL
Status: DISCONTINUED | OUTPATIENT
Start: 2025-04-21 | End: 2025-04-24 | Stop reason: HOSPADM

## 2025-04-21 RX ORDER — CARBOPROST TROMETHAMINE 250 UG/ML
250 INJECTION, SOLUTION INTRAMUSCULAR
Status: DISCONTINUED | OUTPATIENT
Start: 2025-04-21 | End: 2025-04-24 | Stop reason: HOSPADM

## 2025-04-21 RX ORDER — HYDROMORPHONE HCL IN WATER/PF 6 MG/30 ML
PATIENT CONTROLLED ANALGESIA SYRINGE INTRAVENOUS
Status: COMPLETED
Start: 2025-04-21 | End: 2025-04-21

## 2025-04-21 RX ORDER — METHYLERGONOVINE MALEATE 0.2 MG/ML
200 INJECTION INTRAVENOUS
Status: DISCONTINUED | OUTPATIENT
Start: 2025-04-21 | End: 2025-04-24 | Stop reason: HOSPADM

## 2025-04-21 RX ORDER — LOPERAMIDE HYDROCHLORIDE 2 MG/1
4 CAPSULE ORAL
Status: DISCONTINUED | OUTPATIENT
Start: 2025-04-21 | End: 2025-04-21 | Stop reason: HOSPADM

## 2025-04-21 RX ORDER — ONDANSETRON 2 MG/ML
4 INJECTION INTRAMUSCULAR; INTRAVENOUS EVERY 6 HOURS PRN
Status: DISCONTINUED | OUTPATIENT
Start: 2025-04-21 | End: 2025-04-21 | Stop reason: HOSPADM

## 2025-04-21 RX ORDER — LOPERAMIDE HYDROCHLORIDE 2 MG/1
4 CAPSULE ORAL
Status: DISCONTINUED | OUTPATIENT
Start: 2025-04-21 | End: 2025-04-24 | Stop reason: HOSPADM

## 2025-04-21 RX ORDER — ACETAMINOPHEN 325 MG/1
975 TABLET ORAL EVERY 6 HOURS
Status: DISCONTINUED | OUTPATIENT
Start: 2025-04-21 | End: 2025-04-24 | Stop reason: HOSPADM

## 2025-04-21 RX ORDER — MORPHINE SULFATE 1 MG/ML
150 INJECTION, SOLUTION EPIDURAL; INTRATHECAL; INTRAVENOUS ONCE
Status: COMPLETED | OUTPATIENT
Start: 2025-04-21 | End: 2025-04-21

## 2025-04-21 RX ORDER — SODIUM CHLORIDE, SODIUM LACTATE, POTASSIUM CHLORIDE, CALCIUM CHLORIDE 600; 310; 30; 20 MG/100ML; MG/100ML; MG/100ML; MG/100ML
INJECTION, SOLUTION INTRAVENOUS CONTINUOUS PRN
Status: DISCONTINUED | OUTPATIENT
Start: 2025-04-21 | End: 2025-04-24 | Stop reason: HOSPADM

## 2025-04-21 RX ORDER — TRANEXAMIC ACID 10 MG/ML
1 INJECTION, SOLUTION INTRAVENOUS EVERY 30 MIN PRN
Status: DISCONTINUED | OUTPATIENT
Start: 2025-04-21 | End: 2025-04-21 | Stop reason: HOSPADM

## 2025-04-21 RX ORDER — HYDROMORPHONE HCL IN WATER/PF 6 MG/30 ML
0.2 PATIENT CONTROLLED ANALGESIA SYRINGE INTRAVENOUS
Status: DISCONTINUED | OUTPATIENT
Start: 2025-04-21 | End: 2025-04-24 | Stop reason: HOSPADM

## 2025-04-21 RX ORDER — OXYTOCIN/0.9 % SODIUM CHLORIDE 30/500 ML
340 PLASTIC BAG, INJECTION (ML) INTRAVENOUS CONTINUOUS PRN
Status: DISCONTINUED | OUTPATIENT
Start: 2025-04-21 | End: 2025-04-24 | Stop reason: HOSPADM

## 2025-04-21 RX ORDER — PROCHLORPERAZINE MALEATE 10 MG
10 TABLET ORAL EVERY 6 HOURS PRN
Status: DISCONTINUED | OUTPATIENT
Start: 2025-04-21 | End: 2025-04-24 | Stop reason: HOSPADM

## 2025-04-21 RX ORDER — MISOPROSTOL 200 UG/1
400 TABLET ORAL
Status: DISCONTINUED | OUTPATIENT
Start: 2025-04-21 | End: 2025-04-21 | Stop reason: HOSPADM

## 2025-04-21 RX ORDER — OXYTOCIN/0.9 % SODIUM CHLORIDE 30/500 ML
100-340 PLASTIC BAG, INJECTION (ML) INTRAVENOUS CONTINUOUS PRN
Status: DISCONTINUED | OUTPATIENT
Start: 2025-04-21 | End: 2025-04-24 | Stop reason: HOSPADM

## 2025-04-21 RX ORDER — OXYTOCIN/0.9 % SODIUM CHLORIDE 30/500 ML
340 PLASTIC BAG, INJECTION (ML) INTRAVENOUS CONTINUOUS PRN
Status: DISCONTINUED | OUTPATIENT
Start: 2025-04-21 | End: 2025-04-21 | Stop reason: HOSPADM

## 2025-04-21 RX ORDER — MISOPROSTOL 200 UG/1
800 TABLET ORAL
Status: DISCONTINUED | OUTPATIENT
Start: 2025-04-21 | End: 2025-04-21 | Stop reason: HOSPADM

## 2025-04-21 RX ORDER — ONDANSETRON 2 MG/ML
4 INJECTION INTRAMUSCULAR; INTRAVENOUS EVERY 6 HOURS PRN
Status: DISCONTINUED | OUTPATIENT
Start: 2025-04-21 | End: 2025-04-24 | Stop reason: HOSPADM

## 2025-04-21 RX ORDER — METOCLOPRAMIDE HYDROCHLORIDE 5 MG/ML
10 INJECTION INTRAMUSCULAR; INTRAVENOUS EVERY 6 HOURS PRN
Status: DISCONTINUED | OUTPATIENT
Start: 2025-04-21 | End: 2025-04-24 | Stop reason: HOSPADM

## 2025-04-21 RX ORDER — NALBUPHINE HYDROCHLORIDE 10 MG/ML
2.5-5 INJECTION INTRAMUSCULAR; INTRAVENOUS; SUBCUTANEOUS EVERY 6 HOURS PRN
Status: DISCONTINUED | OUTPATIENT
Start: 2025-04-21 | End: 2025-04-24 | Stop reason: HOSPADM

## 2025-04-21 RX ORDER — ONDANSETRON 2 MG/ML
INJECTION INTRAMUSCULAR; INTRAVENOUS PRN
Status: DISCONTINUED | OUTPATIENT
Start: 2025-04-21 | End: 2025-04-21

## 2025-04-21 RX ORDER — DEXTROSE, SODIUM CHLORIDE, SODIUM LACTATE, POTASSIUM CHLORIDE, AND CALCIUM CHLORIDE 5; .6; .31; .03; .02 G/100ML; G/100ML; G/100ML; G/100ML; G/100ML
INJECTION, SOLUTION INTRAVENOUS CONTINUOUS
Status: DISCONTINUED | OUTPATIENT
Start: 2025-04-21 | End: 2025-04-24 | Stop reason: HOSPADM

## 2025-04-21 RX ORDER — AMOXICILLIN 250 MG
2 CAPSULE ORAL 2 TIMES DAILY
Status: DISCONTINUED | OUTPATIENT
Start: 2025-04-21 | End: 2025-04-24 | Stop reason: HOSPADM

## 2025-04-21 RX ORDER — HYDROCORTISONE 25 MG/G
CREAM TOPICAL 3 TIMES DAILY PRN
Status: DISCONTINUED | OUTPATIENT
Start: 2025-04-21 | End: 2025-04-24 | Stop reason: HOSPADM

## 2025-04-21 RX ORDER — OXYCODONE HYDROCHLORIDE 5 MG/1
5 TABLET ORAL EVERY 4 HOURS PRN
Status: DISCONTINUED | OUTPATIENT
Start: 2025-04-21 | End: 2025-04-24 | Stop reason: HOSPADM

## 2025-04-21 RX ORDER — CARBOPROST TROMETHAMINE 250 UG/ML
250 INJECTION, SOLUTION INTRAMUSCULAR
Status: DISCONTINUED | OUTPATIENT
Start: 2025-04-21 | End: 2025-04-21 | Stop reason: HOSPADM

## 2025-04-21 RX ORDER — METOCLOPRAMIDE HYDROCHLORIDE 5 MG/ML
10 INJECTION INTRAMUSCULAR; INTRAVENOUS EVERY 6 HOURS PRN
Status: DISCONTINUED | OUTPATIENT
Start: 2025-04-21 | End: 2025-04-21 | Stop reason: HOSPADM

## 2025-04-21 RX ORDER — ONDANSETRON 4 MG/1
4 TABLET, ORALLY DISINTEGRATING ORAL EVERY 6 HOURS PRN
Status: DISCONTINUED | OUTPATIENT
Start: 2025-04-21 | End: 2025-04-21 | Stop reason: HOSPADM

## 2025-04-21 RX ORDER — LIDOCAINE 40 MG/G
CREAM TOPICAL
Status: DISCONTINUED | OUTPATIENT
Start: 2025-04-21 | End: 2025-04-24 | Stop reason: HOSPADM

## 2025-04-21 RX ORDER — AMOXICILLIN 250 MG
1 CAPSULE ORAL 2 TIMES DAILY
Status: DISCONTINUED | OUTPATIENT
Start: 2025-04-21 | End: 2025-04-24 | Stop reason: HOSPADM

## 2025-04-21 RX ORDER — CITRIC ACID/SODIUM CITRATE 334-500MG
30 SOLUTION, ORAL ORAL
Status: COMPLETED | OUTPATIENT
Start: 2025-04-21 | End: 2025-04-21

## 2025-04-21 RX ORDER — LOPERAMIDE HYDROCHLORIDE 2 MG/1
2 CAPSULE ORAL
Status: DISCONTINUED | OUTPATIENT
Start: 2025-04-21 | End: 2025-04-24 | Stop reason: HOSPADM

## 2025-04-21 RX ORDER — CITRIC ACID/SODIUM CITRATE 334-500MG
SOLUTION, ORAL ORAL
Status: COMPLETED
Start: 2025-04-21 | End: 2025-04-21

## 2025-04-21 RX ORDER — INSULIN GLARGINE 100 [IU]/ML
16 INJECTION, SOLUTION SUBCUTANEOUS AT BEDTIME
Status: ON HOLD | COMMUNITY
End: 2025-04-24

## 2025-04-21 RX ADMIN — HYDROMORPHONE HYDROCHLORIDE 0.2 MG: 0.2 INJECTION, SOLUTION INTRAMUSCULAR; INTRAVENOUS; SUBCUTANEOUS at 16:23

## 2025-04-21 RX ADMIN — PHENYLEPHRINE HYDROCHLORIDE 100 MCG: 10 INJECTION INTRAVENOUS at 13:49

## 2025-04-21 RX ADMIN — ACETAMINOPHEN 975 MG: 325 TABLET, FILM COATED ORAL at 18:45

## 2025-04-21 RX ADMIN — SODIUM CHLORIDE, SODIUM LACTATE, POTASSIUM CHLORIDE, AND CALCIUM CHLORIDE: .6; .31; .03; .02 INJECTION, SOLUTION INTRAVENOUS at 13:59

## 2025-04-21 RX ADMIN — Medication 0.2 MG: at 16:23

## 2025-04-21 RX ADMIN — ONDANSETRON 4 MG: 2 INJECTION INTRAMUSCULAR; INTRAVENOUS at 13:00

## 2025-04-21 RX ADMIN — Medication 3 G: at 12:58

## 2025-04-21 RX ADMIN — ACETAMINOPHEN 975 MG: 325 TABLET ORAL at 12:36

## 2025-04-21 RX ADMIN — Medication 30 ML: at 12:35

## 2025-04-21 RX ADMIN — KETOROLAC TROMETHAMINE 15 MG: 15 INJECTION, SOLUTION INTRAMUSCULAR; INTRAVENOUS at 19:54

## 2025-04-21 RX ADMIN — Medication 340 ML/HR: at 13:32

## 2025-04-21 RX ADMIN — SODIUM CHLORIDE, SODIUM LACTATE, POTASSIUM CHLORIDE, AND CALCIUM CHLORIDE: .6; .31; .03; .02 INJECTION, SOLUTION INTRAVENOUS at 12:55

## 2025-04-21 RX ADMIN — DEXTROSE, SODIUM CHLORIDE, SODIUM LACTATE, POTASSIUM CHLORIDE, AND CALCIUM CHLORIDE: 5; .6; .31; .03; .02 INJECTION, SOLUTION INTRAVENOUS at 17:17

## 2025-04-21 RX ADMIN — PHENYLEPHRINE HYDROCHLORIDE 200 MCG: 10 INJECTION INTRAVENOUS at 13:07

## 2025-04-21 RX ADMIN — PHENYLEPHRINE HYDROCHLORIDE 100 MCG: 10 INJECTION INTRAVENOUS at 13:36

## 2025-04-21 RX ADMIN — SODIUM CHLORIDE, SODIUM LACTATE, POTASSIUM CHLORIDE, AND CALCIUM CHLORIDE 500 ML: .6; .31; .03; .02 INJECTION, SOLUTION INTRAVENOUS at 12:21

## 2025-04-21 RX ADMIN — ACETAMINOPHEN 975 MG: 325 TABLET, FILM COATED ORAL at 12:36

## 2025-04-21 RX ADMIN — MORPHINE SULFATE 150 MCG: 1 INJECTION, SOLUTION EPIDURAL; INTRATHECAL; INTRAVENOUS at 13:03

## 2025-04-21 RX ADMIN — PHENYLEPHRINE HYDROCHLORIDE 0.5 MCG/KG/MIN: 10 INJECTION INTRAVENOUS at 13:03

## 2025-04-21 RX ADMIN — SODIUM CITRATE AND CITRIC ACID MONOHYDRATE 30 ML: 500; 334 SOLUTION ORAL at 12:35

## 2025-04-21 RX ADMIN — TRANEXAMIC ACID 1 G: 1 INJECTION, SOLUTION INTRAVENOUS at 13:35

## 2025-04-21 RX ADMIN — BUPIVACAINE HYDROCHLORIDE IN DEXTROSE 1.6 ML: 7.5 INJECTION, SOLUTION SUBARACHNOID at 13:03

## 2025-04-21 RX ADMIN — SENNOSIDES AND DOCUSATE SODIUM 1 TABLET: 50; 8.6 TABLET ORAL at 19:54

## 2025-04-21 RX ADMIN — PHENYLEPHRINE HYDROCHLORIDE 100 MCG: 10 INJECTION INTRAVENOUS at 13:03

## 2025-04-21 ASSESSMENT — ACTIVITIES OF DAILY LIVING (ADL)
ADLS_ACUITY_SCORE: 18
ADLS_ACUITY_SCORE: 41
ADLS_ACUITY_SCORE: 18

## 2025-04-21 ASSESSMENT — ENCOUNTER SYMPTOMS: SEIZURES: 0

## 2025-04-21 ASSESSMENT — LIFESTYLE VARIABLES: TOBACCO_USE: 0

## 2025-04-21 NOTE — PLAN OF CARE
Pt. admitted from L&D via bed.. Pt. arrived with baby and was accompanied by spouse and arrived with personal belongings. Report was taken from [facility] in L&D.  Pt. is A&Ox3 and VSS on RA. Fundus is firm and midline.  Vaginal bleeding is scant.   Pt. c/o 4/10 pain. Pt. denied nausea, CP, SOB, lightheadedness, or dizziness. LS clear and BS hypo. PIV patent and infusing.  Jennings patent and draining.  Dressing to lower abdomen CDI.  Pneumoboots in place to BLE.  Pt. oriented to the room and call light system.

## 2025-04-21 NOTE — PLAN OF CARE
Data: Melva Escobar transferred to 403 via cart at 1630. Baby transferred via parent's arms.  Action: Receiving unit notified of transfer: Yes. Patient and family notified of room change. Report given to Alix CHAVEZ RN at 1630. Belongings sent to receiving unit. Accompanied by Registered Nurse. Oriented patient to surroundings. Call light within reach. ID bands double-checked with receiving RN.  Response: Patient tolerated transfer and is stable.

## 2025-04-21 NOTE — ANESTHESIA PREPROCEDURE EVALUATION
Anesthesia Pre-Procedure Evaluation    Patient: Melva Escobar   MRN: [id number] : 1995        Procedure : Procedure(s):  Primary  section          Past Medical History:   Diagnosis Date    Current moderate episode of major depressive disorder without prior episode (H) 2023      Past Surgical History:   Procedure Laterality Date    NO HISTORY OF SURGERY        No Known Allergies   Social History     Tobacco Use    Smoking status: Never    Smokeless tobacco: Never   Substance Use Topics    Alcohol use: Not Currently     Comment: occ.       Wt Readings from Last 1 Encounters:   25 133.8 kg (295 lb)        Anesthesia Evaluation   Pt has had prior anesthetic.     No history of anesthetic complications       ROS/MED HX  ENT/Pulmonary:    (-) tobacco use and sleep apnea   Neurologic:    (-) no seizures and no CVA   Cardiovascular:     (+)  hypertension- -   -  - -                                      METS/Exercise Tolerance:     Hematologic:       Musculoskeletal:       GI/Hepatic:    (-) GERD and liver disease   Renal/Genitourinary:    (-) renal disease   Endo:     (+)               Obesity,   gestational diabetes, (-) Type II DM and thyroid disease   Psychiatric/Substance Use:     (+) psychiatric history depression       Infectious Disease:       Malignancy:       Other:            Physical Exam    Airway        Mallampati: II   TM distance: > 3 FB   Neck ROM: full   Mouth opening: > 3 cm    Respiratory Devices and Support         Dental       (+) Minor Abnormalities - some fillings, tiny chips      Cardiovascular   cardiovascular exam normal          Pulmonary   pulmonary exam normal                OUTSIDE LABS:  CBC:   Lab Results   Component Value Date    WBC 13.4 (H) 2025    WBC 11.6 (H) 2025    HGB 11.4 (L) 2025    HGB 10.9 (L) 2025    HCT 34.1 (L) 2025    HCT 33.2 (L) 2025     2025     2025     BMP:   Lab Results   Component  "Value Date    CR 0.61 03/25/2025     COAGS: No results found for: \"PTT\", \"INR\", \"FIBR\"  POC:   Lab Results   Component Value Date    HCG Positive (A) 04/16/2024     HEPATIC:   Lab Results   Component Value Date    ALT 15 04/01/2025    AST 15 04/01/2025     OTHER:   Lab Results   Component Value Date    A1C 5.7 (H) 09/17/2024       Anesthesia Plan    ASA Status:  3    NPO Status:  NPO Appropriate    Anesthesia Type: Spinal.              Consents    Anesthesia Plan(s) and associated risks, benefits, and realistic alternatives discussed. Questions answered and patient/representative(s) expressed understanding.     - Discussed: Risks, Benefits and Alternatives for the PROCEDURE were discussed     - Discussed with:  Patient            Postoperative Care    Pain management: Multi-modal analgesia.   PONV prophylaxis: Ondansetron (or other 5HT-3)     Comments:               Main Foote MD    Clinically Significant Risk Factors Present on Admission                 # Drug Induced Platelet Defect: home medication list includes an antiplatelet medication                          "

## 2025-04-21 NOTE — BRIEF OP NOTE
[facility] Brief Operative Note    Pre-operative diagnosis: Leiomyoma of uterus affecting pregnancy in third trimester ; IUP at 39+1; A2GDM; suspected macrosomia   Post-operative diagnosis same   Procedure: Primary LST   spinal   Surgeon(s): Surgeons and Role:     * Taylor Martell MD - Primary     * Courtney Mo MD - Assisting   Estimated blood loss: 1000cc    Specimens: ID Type Source Tests Collected by Time Destination   A :  Placenta Placenta SEE PROVIDERS ORDERS Taylor Martell MD 2025  1:31 PM    B :  Tissue Umbilical Cord SEE PROVIDERS ORDERS Taylor Martell MD 2025  1:31 PM    C :  Cord blood Umbilical Cord OR DOCUMENTATION ONLY Taylor Martell MD 2025  1:31 PM       Findings: Female; vtx; 9#; 8/9 apgars; large amount of amniotic fluid ( 1800 ml); anterior uterine fibroid 6 cm; fibroid on upper right anterior wall 8 cm; normal left and right ovaries and FT    No complications

## 2025-04-21 NOTE — ANESTHESIA CARE TRANSFER NOTE
Patient: Melva Escobar    Procedure: Procedure(s):  Primary  section - with possible aspiration of right ovarian cyst       Diagnosis: Leiomyoma of uterus affecting pregnancy in third trimester [O34.13, D25.9]  Diagnosis Additional Information: No value filed.    Anesthesia Type:   Spinal     Note:    Oropharynx: oropharynx clear of all foreign objects and spontaneously breathing  Level of Consciousness: awake  Oxygen Supplementation: room air    Independent Airway: airway patency satisfactory and stable  Dentition: dentition unchanged  Vital Signs Stable: post-procedure vital signs reviewed and stable  Report to RN Given: handoff report given  Patient transferred to: Labor and Delivery    Handoff Report: Identifed the Patient, Identified the Reponsible Provider, Reviewed the pertinent medical history, Discussed the surgical course, Reviewed Intra-OP anesthesia mangement and issues during anesthesia, Set expectations for post-procedure period and Allowed opportunity for questions and acknowledgement of understanding      Vitals:  Vitals Value Taken Time   /62 25 1422   Temp 36.4  C (97.5  F) 25 1422   Pulse 86 25 1428   Resp 12 25 1428   SpO2 97 % 25 1428   Vitals shown include unfiled device data.    Electronically Signed By: Christine Marie Volp Hodgkins, CRNA, APRN CRNA  2025  2:28 PM

## 2025-04-21 NOTE — PLAN OF CARE
Pt arrives for scheduled primary C section. Pt states reason for C section is multiple uterine fibroids, and suspected macrosomia. EUS/TOCO applied. VSS. Prenatal and medical history reviewed with pt. , Ayan at bedside. Pre-op care started.

## 2025-04-21 NOTE — ANESTHESIA PROCEDURE NOTES
"Intrathecal injection Procedure Note    Pre-Procedure   Staff -        Anesthesiologist:  Main Foote MD       Performed By: anesthesiologist       Location: OR       Pre-Anesthestic Checklist: patient identified, IV checked, site marked, risks and benefits discussed, informed consent, monitors and equipment checked, pre-op evaluation, at physician/surgeon's request and post-op pain management  Timeout:       Correct Patient: Yes        Correct Procedure: Yes        Correct Site: Yes        Correct Position: Yes   Procedure Documentation  Procedure: intrathecal injection         Patient Position: sitting       Patient Prep/Sterile Barriers: sterile gloves, mask, patient draped       Skin prep: Betadine       Insertion Site: L3-4. (midline approach).       Needle Gauge: 24.        Needle Length (Inches): 4        Spinal Needle Type: Pencan       Introducer used       # of attempts: 1 and  # of redirects:     Assessment/Narrative         Paresthesias: No.       CSF fluid: clear.     Comments:  No complications       FOR [facility] ([facility]) ONLY:   Pain Team Contact information: please page the Pain Team Via [employer]. Search \"Pain\". During daytime hours, please page the attending first. At night please page the resident first.      "

## 2025-04-22 LAB
ABO + RH BLD: NORMAL
FETAL CELL SCN BLD-IMP: NORMAL
GLUCOSE BLDC GLUCOMTR-MCNC: 88 MG/DL (ref 70–99)
HGB BLD-MCNC: 9.3 G/DL (ref 11.7–15.7)
SPECIMEN EXP DATE BLD: NORMAL

## 2025-04-22 PROCEDURE — 85018 HEMOGLOBIN: CPT | Performed by: OBSTETRICS & GYNECOLOGY

## 2025-04-22 PROCEDURE — 120N000012 HC R&B POSTPARTUM

## 2025-04-22 PROCEDURE — 36415 COLL VENOUS BLD VENIPUNCTURE: CPT | Performed by: OBSTETRICS & GYNECOLOGY

## 2025-04-22 PROCEDURE — 250N000011 HC RX IP 250 OP 636: Mod: JZ | Performed by: OBSTETRICS & GYNECOLOGY

## 2025-04-22 PROCEDURE — 250N000013 HC RX MED GY IP 250 OP 250 PS 637: Performed by: MIDWIFE

## 2025-04-22 PROCEDURE — 250N000013 HC RX MED GY IP 250 OP 250 PS 637: Performed by: OBSTETRICS & GYNECOLOGY

## 2025-04-22 PROCEDURE — 86901 BLOOD TYPING SEROLOGIC RH(D): CPT | Performed by: OBSTETRICS & GYNECOLOGY

## 2025-04-22 RX ORDER — FERROUS SULFATE 325(65) MG
325 TABLET ORAL 2 TIMES DAILY
Status: DISCONTINUED | OUTPATIENT
Start: 2025-04-22 | End: 2025-04-24 | Stop reason: HOSPADM

## 2025-04-22 RX ADMIN — SENNOSIDES AND DOCUSATE SODIUM 2 TABLET: 50; 8.6 TABLET ORAL at 20:36

## 2025-04-22 RX ADMIN — ACETAMINOPHEN 975 MG: 325 TABLET, FILM COATED ORAL at 06:33

## 2025-04-22 RX ADMIN — SIMETHICONE 80 MG: 80 TABLET, CHEWABLE ORAL at 20:37

## 2025-04-22 RX ADMIN — FERROUS SULFATE TAB 325 MG (65 MG ELEMENTAL FE) 325 MG: 325 (65 FE) TAB at 20:36

## 2025-04-22 RX ADMIN — IBUPROFEN 800 MG: 400 TABLET, FILM COATED ORAL at 20:36

## 2025-04-22 RX ADMIN — ACETAMINOPHEN 975 MG: 325 TABLET, FILM COATED ORAL at 18:33

## 2025-04-22 RX ADMIN — SENNOSIDES AND DOCUSATE SODIUM 1 TABLET: 50; 8.6 TABLET ORAL at 08:38

## 2025-04-22 RX ADMIN — MAGNESIUM HYDROXIDE 30 ML: 400 SUSPENSION ORAL at 23:46

## 2025-04-22 RX ADMIN — KETOROLAC TROMETHAMINE 15 MG: 15 INJECTION, SOLUTION INTRAMUSCULAR; INTRAVENOUS at 01:57

## 2025-04-22 RX ADMIN — IBUPROFEN 800 MG: 400 TABLET, FILM COATED ORAL at 14:30

## 2025-04-22 RX ADMIN — OXYCODONE HYDROCHLORIDE 5 MG: 5 TABLET ORAL at 18:50

## 2025-04-22 RX ADMIN — FERROUS SULFATE TAB 325 MG (65 MG ELEMENTAL FE) 325 MG: 325 (65 FE) TAB at 12:00

## 2025-04-22 RX ADMIN — ACETAMINOPHEN 975 MG: 325 TABLET, FILM COATED ORAL at 00:45

## 2025-04-22 RX ADMIN — SIMETHICONE 80 MG: 80 TABLET, CHEWABLE ORAL at 14:30

## 2025-04-22 RX ADMIN — ACETAMINOPHEN 975 MG: 325 TABLET, FILM COATED ORAL at 11:59

## 2025-04-22 RX ADMIN — HUMAN RHO(D) IMMUNE GLOBULIN 300 MCG: 1500 SOLUTION INTRAMUSCULAR; INTRAVENOUS at 08:39

## 2025-04-22 RX ADMIN — KETOROLAC TROMETHAMINE 15 MG: 15 INJECTION, SOLUTION INTRAMUSCULAR; INTRAVENOUS at 08:38

## 2025-04-22 ASSESSMENT — ACTIVITIES OF DAILY LIVING (ADL)
ADLS_ACUITY_SCORE: 18
ADLS_ACUITY_SCORE: 29
ADLS_ACUITY_SCORE: 27
ADLS_ACUITY_SCORE: 33
ADLS_ACUITY_SCORE: 27
ADLS_ACUITY_SCORE: 27
ADLS_ACUITY_SCORE: 29
ADLS_ACUITY_SCORE: 33
ADLS_ACUITY_SCORE: 29
ADLS_ACUITY_SCORE: 29
ADLS_ACUITY_SCORE: 18
ADLS_ACUITY_SCORE: 29
ADLS_ACUITY_SCORE: 33
ADLS_ACUITY_SCORE: 29
ADLS_ACUITY_SCORE: 29
ADLS_ACUITY_SCORE: 27

## 2025-04-22 NOTE — PROGRESS NOTES
"OB CS post op note  POD# 1    S:  Patient doing well.  Pain controlled with duramorph.  Bladimir reg diet, Jennings removed this morning, void pending.  Has not yet passed flatus but feels \"gassy\".   Independently ambulating,  Bleeding scant.  Formula feeding.  Denies HA, dizziness or SOB    O:  /69 (BP Location: Right arm, Cuff Size: Adult Large)   Pulse 85   Temp 97.7  F (36.5  C) (Oral)   Resp 16   Ht 1.702 m (5' 7\")   Wt 133.8 kg (295 lb)   LMP 2024   SpO2 100%   Breastfeeding Unknown   BMI 46.20 kg/m      Intake/Output Summary (Last 24 hours) at 2025 0834  Last data filed at 2025 0600  Gross per 24 hour   Intake 3326.5 ml   Output 2645 ml   Net 681.5 ml      Gen- A&O, NAD  PULM: CTAB  CV: RRR  Abd- Non-tender, fundus firm at umbilicus  Incision: Dressing clean, dry and intact  Ext- non-tender, neg edema    Hemoglobin   Date Value Ref Range Status   2025 9.3 (L) 11.7 - 15.7 g/dL Final     A NEG  Rubella Immune  Fasting BS 88    A/P: 30 year old  POD# 1 s/p primary LTCS for suspected fetal macrosomia, GDMA2, uterine fibroid.  Asymptomatic anemia r/t acute blood loss r/t , Formula feeding    1.  Routine post-partum cares.   - Pain meds as ordered   - Diet as tolerated   - Ambulate    - IS as needed   - start ferrous sulfate 325mg BID  2.  Anticipate d/c home on POD# 3 or 4.    Dinorah Bernal CNM  2025  8:34 AM    "

## 2025-04-22 NOTE — PLAN OF CARE
Goal Outcome Evaluation:      Plan of Care Reviewed With: patient, spouse    Overall Patient Progress: improvingOverall Patient Progress: improving     VSS. Fundus firm and midline. Scant flow. Pain 5/10, pain controled with tylenol and toradol/ ibuprofen per MAR. Dressing CDI. Bottling. Ambulating free of dizziness or lightheaded. Voiding without difficulty. Encouraged to call with needs, questions, or concerns.

## 2025-04-22 NOTE — PLAN OF CARE
Goal Outcome Evaluation:      Plan of Care Reviewed With: patient, spouse    Overall Patient Progress: improvingOverall Patient Progress: improving     Vitals within defined limits. Dressing to  incision clean, dry, in place. Fundus firm. Lochia scant to light, no clot noted. Using Tylenol/Toradol for abdominal and incisional pain with good relief. Abdominal binder on for comfort. Lung sounds clear. Bowel sounds hypoactive, patient denies passing gas at this time. Patient reported feeling slightly dizzy the first time ambulating, better the second time. Up to bathroom with assist x1. Due to void post lopez removal. Bottling  with formula. Spouse at bedside and supportive.

## 2025-04-23 LAB
ALT SERPL W P-5'-P-CCNC: 9 U/L (ref 0–50)
AST SERPL W P-5'-P-CCNC: 18 U/L (ref 0–45)
CREAT SERPL-MCNC: 0.55 MG/DL (ref 0.51–0.95)
EGFRCR SERPLBLD CKD-EPI 2021: >90 ML/MIN/1.73M2
ERYTHROCYTE [DISTWIDTH] IN BLOOD BY AUTOMATED COUNT: 16.3 % (ref 10–15)
HCT VFR BLD AUTO: 27 % (ref 35–47)
HGB BLD-MCNC: 8.5 G/DL (ref 11.7–15.7)
MCH RBC QN AUTO: 26.3 PG (ref 26.5–33)
MCHC RBC AUTO-ENTMCNC: 31.5 G/DL (ref 31.5–36.5)
MCV RBC AUTO: 84 FL (ref 78–100)
PLATELET # BLD AUTO: 209 10E3/UL (ref 150–450)
RBC # BLD AUTO: 3.23 10E6/UL (ref 3.8–5.2)
WBC # BLD AUTO: 11 10E3/UL (ref 4–11)

## 2025-04-23 PROCEDURE — 250N000011 HC RX IP 250 OP 636: Performed by: OBSTETRICS & GYNECOLOGY

## 2025-04-23 PROCEDURE — 250N000013 HC RX MED GY IP 250 OP 250 PS 637: Performed by: OBSTETRICS & GYNECOLOGY

## 2025-04-23 PROCEDURE — 84450 TRANSFERASE (AST) (SGOT): CPT | Performed by: OBSTETRICS & GYNECOLOGY

## 2025-04-23 PROCEDURE — 82565 ASSAY OF CREATININE: CPT | Performed by: OBSTETRICS & GYNECOLOGY

## 2025-04-23 PROCEDURE — 120N000012 HC R&B POSTPARTUM

## 2025-04-23 PROCEDURE — 250N000013 HC RX MED GY IP 250 OP 250 PS 637: Performed by: MIDWIFE

## 2025-04-23 PROCEDURE — 258N000003 HC RX IP 258 OP 636: Performed by: OBSTETRICS & GYNECOLOGY

## 2025-04-23 PROCEDURE — 84460 ALANINE AMINO (ALT) (SGPT): CPT | Performed by: OBSTETRICS & GYNECOLOGY

## 2025-04-23 PROCEDURE — 85027 COMPLETE CBC AUTOMATED: CPT | Performed by: OBSTETRICS & GYNECOLOGY

## 2025-04-23 PROCEDURE — 36415 COLL VENOUS BLD VENIPUNCTURE: CPT | Performed by: OBSTETRICS & GYNECOLOGY

## 2025-04-23 RX ORDER — LABETALOL HYDROCHLORIDE 5 MG/ML
20-80 INJECTION, SOLUTION INTRAVENOUS EVERY 10 MIN PRN
Status: DISCONTINUED | OUTPATIENT
Start: 2025-04-23 | End: 2025-04-24 | Stop reason: HOSPADM

## 2025-04-23 RX ORDER — CALCIUM GLUCONATE 94 MG/ML
1 INJECTION, SOLUTION INTRAVENOUS
Status: DISCONTINUED | OUTPATIENT
Start: 2025-04-23 | End: 2025-04-24 | Stop reason: HOSPADM

## 2025-04-23 RX ORDER — HYDRALAZINE HYDROCHLORIDE 20 MG/ML
10 INJECTION INTRAMUSCULAR; INTRAVENOUS
Status: DISCONTINUED | OUTPATIENT
Start: 2025-04-23 | End: 2025-04-24 | Stop reason: HOSPADM

## 2025-04-23 RX ORDER — MAGNESIUM SULFATE HEPTAHYDRATE 40 MG/ML
4 INJECTION, SOLUTION INTRAVENOUS
Status: DISCONTINUED | OUTPATIENT
Start: 2025-04-23 | End: 2025-04-24 | Stop reason: HOSPADM

## 2025-04-23 RX ORDER — SODIUM CHLORIDE, SODIUM LACTATE, POTASSIUM CHLORIDE, CALCIUM CHLORIDE 600; 310; 30; 20 MG/100ML; MG/100ML; MG/100ML; MG/100ML
10-125 INJECTION, SOLUTION INTRAVENOUS CONTINUOUS
Status: DISCONTINUED | OUTPATIENT
Start: 2025-04-23 | End: 2025-04-24 | Stop reason: HOSPADM

## 2025-04-23 RX ORDER — MAGNESIUM SULFATE HEPTAHYDRATE 40 MG/ML
2 INJECTION, SOLUTION INTRAVENOUS
Status: DISCONTINUED | OUTPATIENT
Start: 2025-04-23 | End: 2025-04-24 | Stop reason: HOSPADM

## 2025-04-23 RX ORDER — MAGNESIUM SULFATE HEPTAHYDRATE 40 MG/ML
4 INJECTION, SOLUTION INTRAVENOUS ONCE
Status: COMPLETED | OUTPATIENT
Start: 2025-04-23 | End: 2025-04-23

## 2025-04-23 RX ORDER — NIFEDIPINE 30 MG/1
30 TABLET, EXTENDED RELEASE ORAL DAILY
Status: DISCONTINUED | OUTPATIENT
Start: 2025-04-23 | End: 2025-04-24 | Stop reason: HOSPADM

## 2025-04-23 RX ORDER — MAGNESIUM SULFATE IN WATER 40 MG/ML
2 INJECTION, SOLUTION INTRAVENOUS CONTINUOUS
Status: DISCONTINUED | OUTPATIENT
Start: 2025-04-23 | End: 2025-04-24 | Stop reason: HOSPADM

## 2025-04-23 RX ORDER — LIDOCAINE 40 MG/G
CREAM TOPICAL
Status: DISCONTINUED | OUTPATIENT
Start: 2025-04-23 | End: 2025-04-24 | Stop reason: HOSPADM

## 2025-04-23 RX ADMIN — SENNOSIDES AND DOCUSATE SODIUM 2 TABLET: 50; 8.6 TABLET ORAL at 08:03

## 2025-04-23 RX ADMIN — FERROUS SULFATE TAB 325 MG (65 MG ELEMENTAL FE) 325 MG: 325 (65 FE) TAB at 21:59

## 2025-04-23 RX ADMIN — SIMETHICONE 80 MG: 80 TABLET, CHEWABLE ORAL at 04:40

## 2025-04-23 RX ADMIN — SODIUM CHLORIDE, SODIUM LACTATE, POTASSIUM CHLORIDE, AND CALCIUM CHLORIDE 75 ML/HR: .6; .31; .03; .02 INJECTION, SOLUTION INTRAVENOUS at 14:59

## 2025-04-23 RX ADMIN — ACETAMINOPHEN 975 MG: 325 TABLET, FILM COATED ORAL at 15:42

## 2025-04-23 RX ADMIN — ACETAMINOPHEN 975 MG: 325 TABLET, FILM COATED ORAL at 00:46

## 2025-04-23 RX ADMIN — NIFEDIPINE 30 MG: 30 TABLET, FILM COATED, EXTENDED RELEASE ORAL at 20:36

## 2025-04-23 RX ADMIN — IBUPROFEN 800 MG: 400 TABLET, FILM COATED ORAL at 18:04

## 2025-04-23 RX ADMIN — MAGNESIUM SULFATE IN WATER 4 G: 40 INJECTION, SOLUTION INTRAVENOUS at 15:00

## 2025-04-23 RX ADMIN — LABETALOL HYDROCHLORIDE 20 MG: 5 INJECTION INTRAVENOUS at 14:51

## 2025-04-23 RX ADMIN — IBUPROFEN 800 MG: 400 TABLET, FILM COATED ORAL at 04:40

## 2025-04-23 RX ADMIN — ACETAMINOPHEN 975 MG: 325 TABLET, FILM COATED ORAL at 21:59

## 2025-04-23 RX ADMIN — ACETAMINOPHEN 975 MG: 325 TABLET, FILM COATED ORAL at 08:03

## 2025-04-23 RX ADMIN — MAGNESIUM SULFATE HEPTAHYDRATE 2 G/HR: 40 INJECTION, SOLUTION INTRAVENOUS at 15:40

## 2025-04-23 RX ADMIN — FERROUS SULFATE TAB 325 MG (65 MG ELEMENTAL FE) 325 MG: 325 (65 FE) TAB at 08:04

## 2025-04-23 RX ADMIN — IBUPROFEN 800 MG: 400 TABLET, FILM COATED ORAL at 12:06

## 2025-04-23 ASSESSMENT — ACTIVITIES OF DAILY LIVING (ADL)
ADLS_ACUITY_SCORE: 27
ADLS_ACUITY_SCORE: 29
ADLS_ACUITY_SCORE: 30
ADLS_ACUITY_SCORE: 34
ADLS_ACUITY_SCORE: 27
ADLS_ACUITY_SCORE: 30
ADLS_ACUITY_SCORE: 30
ADLS_ACUITY_SCORE: 29
ADLS_ACUITY_SCORE: 29
ADLS_ACUITY_SCORE: 30
ADLS_ACUITY_SCORE: 29
ADLS_ACUITY_SCORE: 27
ADLS_ACUITY_SCORE: 27
ADLS_ACUITY_SCORE: 29
ADLS_ACUITY_SCORE: 34
ADLS_ACUITY_SCORE: 27
ADLS_ACUITY_SCORE: 34
ADLS_ACUITY_SCORE: 30
ADLS_ACUITY_SCORE: 29
ADLS_ACUITY_SCORE: 27

## 2025-04-23 NOTE — PROVIDER NOTIFICATION
Nurse notified  That pt had a second bp over 140  (142/88) after resting in bed. This was her second bp with a diastolic over 140.  The first bp was after she had been moving around in bed.  asked nurse to get a bp and update her.  Patient has been asymptomatic throughout.

## 2025-04-23 NOTE — PROGRESS NOTES
Pt given 20 mg IV of Labetelol and bp checked every 10 minutes.  Subsequent bp's below 160/110  Loading dose of magnesium given and pt educated on protocol and signs of preeclampsia.  Pt has denied HA and vision changes all during shift.  Report given to Kary CHAVEZ RN.  Vital signs had been in 140's during shift.  Fundus firm and she is moving well.

## 2025-04-23 NOTE — PROGRESS NOTES
Notified by RN of /92, with repeat in 15 min of 17/103. STAT pre-e labs ordered and IV labetalol protocol ordered. Will also start IV magnesium for seizure prophylaxis. Patient denies pain, RUQ pain, or vision changes.     Leigh Gupta MD   [facility]  4/23/2025 2:28 PM

## 2025-04-23 NOTE — PROGRESS NOTES
"OB CS post op note  POD# 2    S:  Patient doing well. Had increased pain yesterday afternoon and overnight. Cramping + gas pain. Feeling better this AM. Did have a BM.  Bladimir reg diet. Voiding without difficulty. Independently ambulating,  Bleeding scant.  Formula feeding.  Denies HA, dizziness or SOB    Denies HA, vision changes, RUQ pain.    O:  BP (!) 141/77 (BP Location: Left arm)   Pulse 98   Temp 98.8  F (37.1  C) (Oral)   Resp 16   Ht 1.702 m (5' 7\")   Wt 133.8 kg (295 lb)   LMP 2024   SpO2 100%   Breastfeeding Unknown   BMI 46.20 kg/m       Gen- A&O, NAD  PULM: breathing comfortably on room air  Abd- appropriately-tender, fundus firm at umbilicus  Incision: Dressing clean, dry and intact  Ext- non-tender, trace edema    Hemoglobin   Date Value Ref Range Status   2025 9.3 (L) 11.7 - 15.7 g/dL Final     A NEG  Rubella Immune  Fasting BS 88    A/P: 30 year old  POD# 2 s/p primary LTCS for suspected fetal macrosomia, GDMA2, uterine fibroid.  Mild range blood pressure this AM, first abnormal blood pressure. No signs of severe preeclampsia or severe GHTN.    Asymptomatic anemia r/t acute blood loss r/t , Formula feeding    1.  Routine post-partum cares.   - Pain meds as ordered   - Diet as tolerated   - Ambulate    - IS as needed   - start ferrous sulfate 325mg BID  2.  Mild range BP  - First elevated BP this morning, mild range. Not yet meeting criteria for GHTN or pre-eclampsia. Monitor closely today.  - If additional elevated BP, plan preeclampsia labs.   - Monitor for signs of worsening symptoms.  3. GDMA2  - FBG wnl  - Plan 2hr GTT at 6 weeks postpartum  4. Rh neg, baby Rh positive. S/p rhogam.     Dispo: Likely POD#3, monitor BP today. Continue to work on pain control today.     MD Misael Laughlin OB/GYN  2025 8:28 AM       "

## 2025-04-23 NOTE — OP NOTE
[facility]  Obstetrics Operative Report    Surgery Date:  2025  Surgeon(s): Taylor Martell MD   Assistants:  Courtney Mo MD    Preoperative Diagnoses:  Intrauterine pregnancy at 39w1d  Fibroid Uterus  A2GDM  Suspected macrosomia    Postoperative diagnoses: Same     Procedure performed:  primary low segment transverse  section     Anesthesia:  Spinal with duramorph  Est Blood Loss (mL): 1000 mL    Specimens: None  Complications: None apparent    Operative findings:   1. Single viable female infant at 13:31 hours on 2025. Apgars of 8 and 9 at one and five minutes.  Birth weight:: 9#0 oz.  Fetal presentation: vertex. Amniotic fluid: clear. Placenta intact with 3 vessel cord.    2.Uterus with multiple fibroids : largest 8 cm mid anterior uterine wall; right ovary with deflated cyst wall; normal left ovary  3. No intraabdominal adhesions.  No abdominal wall adhesions      Indication: Melva Escobar is a 30 year old, , who was admitted at 39w1d for a scheduled  due to suspected macrosomia, A2GDM and known fibroid uterus with a large fibroid in the anterior JANNIE.  . The risks, benefits, and alternatives of  delivery were explained and the patient agreed to proceed.     Procedure details:  After obtaining informed consent the patient was taken to the operating room. A safety patient time out was performed prior to the procedure. SCD's were placed. The patient received spinal anesthesia with duramoprh prior to the skin incision. A lopez was placed. She was placed in the dorsal supine position with a leftward tilt.    A TRAXI retractorr was placed and the pt then prepped and draped in the usual sterile fashion. Nursing staff were present and available for baby.     Following test of adequate anesthesia, the abdomen was entered through a Pfannenstiel skin incision  The skin incision was made sharply and carried through the subcutaneous tissue to the  fascia.  Fascia was incised in the midline and extended laterally with the Meza scissors.  The superior margin of the fascial incision was grasped with Kocher clamps and dissected from the underlying muscle sharp and blunt dissecton, which was then repeated at the lower margin of the fascial incision.  The muscle was  in the midline.  The peritoneum was entered bluntly and the opening extended by sharp and digital dissection with care to avoid the bladder. Hill retractor was placed.     An 8 cm large anterior fibroid and a second smaller 4 cm fibroid were noted on the anterior wall. The lower segment of the uterus was able to opened sharply in a transverse fashion below the fibroids and extended with bandage scissors and digital pressure. The infant's head was elevated to the level of the hysterotomy and was delivered atraumatically. The infant's body followed thereafter with fundal pressure.     Oxytocin was given through the running IV. The cord was doubly clamped after delayed cord clamping of 60 seconds and cut and the infant was handed off to the waiting nursing staff. A segment of the cord was cut and set aside for cord gases if needed. Cord blood was obtained.     The placenta was extracted with fundal massage and cord traction.  The uterus was not exteriorized from the abdomen and cleared of all clots and debris.  The uterus was massaged and was noted to be firm.  TXA was given due to large fibroids and  With vigorous massage as well as administration of oxytocin, good uterine tone was achieved. The hysterotomy was repaired with   0-vicryl suture in a running locked fashion. A 2nd layer of 0-vicryl in a running, non-locked fashion was used to imbricate the incision and good hemostasis was achieved. The bladder flap was inspected and found to be hemostatic.   Hill retractor was removed. The bilateral pericolic gutters were irrigated cleared of all clots.  The hysterotomy was again inspected and  found to have no active sites of bleeding.  The abdominal wall was examined and found to have no active sites of bleeding.      The fascia was closed with a running suture of 0-vicryl.  Subcutaneous tissue was irrigated. Areas that were oozing were controlled with cautery. The subcutaneous tissue was  reapproximated with 3-0 plain suture. The skin was closed with 4-0 monocryl.    The patient tolerated the procedure well and was taken to the recovery room in stable condition. All sponge, needle and instrument counts were correct x2.

## 2025-04-23 NOTE — PLAN OF CARE
Goal Outcome Evaluation:      Plan of Care Reviewed With: patient, spouse    Overall Patient Progress: no changeOverall Patient Progress: no change     Vitals within defined limits.  incision well-approximated -  Aquacel in place. Fundus firm. Lochia scant. Patient's main complaint overnight was having gas discomfort. Using Tylenol/Motrin for pain management with some relief. Abdominal binder and heat packs used for comfort. Simethicone and Milk of Magnesia given as well. Had patient lay on left side to help with passing gas overnight. Patient ambulated in hallways 4x.  Lung sounds clear. Bowel sounds hypoactive, patient reports passing gas after trying all the interventions. Up ad tuan. Voiding without issues. Bottling  with formula. Spouse at bedside and supportive.

## 2025-04-24 VITALS
BODY MASS INDEX: 45.06 KG/M2 | HEART RATE: 104 BPM | DIASTOLIC BLOOD PRESSURE: 74 MMHG | WEIGHT: 287.1 LBS | SYSTOLIC BLOOD PRESSURE: 123 MMHG | TEMPERATURE: 98.5 F | OXYGEN SATURATION: 98 % | HEIGHT: 67 IN | RESPIRATION RATE: 16 BRPM

## 2025-04-24 PROCEDURE — 250N000013 HC RX MED GY IP 250 OP 250 PS 637: Performed by: OBSTETRICS & GYNECOLOGY

## 2025-04-24 PROCEDURE — 250N000013 HC RX MED GY IP 250 OP 250 PS 637: Performed by: MIDWIFE

## 2025-04-24 PROCEDURE — 258N000003 HC RX IP 258 OP 636: Performed by: OBSTETRICS & GYNECOLOGY

## 2025-04-24 PROCEDURE — 250N000011 HC RX IP 250 OP 636: Performed by: OBSTETRICS & GYNECOLOGY

## 2025-04-24 RX ORDER — AMOXICILLIN 250 MG
1 CAPSULE ORAL 2 TIMES DAILY PRN
COMMUNITY
Start: 2025-04-24

## 2025-04-24 RX ORDER — IBUPROFEN 800 MG/1
800 TABLET, FILM COATED ORAL EVERY 6 HOURS
COMMUNITY
Start: 2025-04-24

## 2025-04-24 RX ORDER — FERROUS SULFATE 325(65) MG
325 TABLET ORAL 2 TIMES DAILY
Qty: 60 TABLET | Refills: 0 | Status: SHIPPED | OUTPATIENT
Start: 2025-04-24

## 2025-04-24 RX ORDER — ACETAMINOPHEN 325 MG/1
975 TABLET ORAL EVERY 6 HOURS PRN
COMMUNITY
Start: 2025-04-24

## 2025-04-24 RX ORDER — NIFEDIPINE 30 MG
30 TABLET, EXTENDED RELEASE ORAL DAILY
Qty: 30 TABLET | Refills: 0 | Status: SHIPPED | OUTPATIENT
Start: 2025-04-25

## 2025-04-24 RX ADMIN — MAGNESIUM SULFATE HEPTAHYDRATE 2 G/HR: 40 INJECTION, SOLUTION INTRAVENOUS at 01:29

## 2025-04-24 RX ADMIN — NIFEDIPINE 30 MG: 30 TABLET, FILM COATED, EXTENDED RELEASE ORAL at 08:23

## 2025-04-24 RX ADMIN — ACETAMINOPHEN 975 MG: 325 TABLET, FILM COATED ORAL at 03:31

## 2025-04-24 RX ADMIN — FERROUS SULFATE TAB 325 MG (65 MG ELEMENTAL FE) 325 MG: 325 (65 FE) TAB at 09:00

## 2025-04-24 RX ADMIN — IBUPROFEN 800 MG: 400 TABLET, FILM COATED ORAL at 00:21

## 2025-04-24 RX ADMIN — SENNOSIDES AND DOCUSATE SODIUM 1 TABLET: 50; 8.6 TABLET ORAL at 08:23

## 2025-04-24 RX ADMIN — ACETAMINOPHEN 975 MG: 325 TABLET, FILM COATED ORAL at 09:25

## 2025-04-24 RX ADMIN — IBUPROFEN 800 MG: 400 TABLET, FILM COATED ORAL at 08:22

## 2025-04-24 RX ADMIN — IBUPROFEN 800 MG: 400 TABLET, FILM COATED ORAL at 16:27

## 2025-04-24 RX ADMIN — SODIUM CHLORIDE, SODIUM LACTATE, POTASSIUM CHLORIDE, AND CALCIUM CHLORIDE 75 ML/HR: .6; .31; .03; .02 INJECTION, SOLUTION INTRAVENOUS at 03:33

## 2025-04-24 RX ADMIN — MAGNESIUM SULFATE HEPTAHYDRATE 2 G/HR: 40 INJECTION, SOLUTION INTRAVENOUS at 10:22

## 2025-04-24 RX ADMIN — ACETAMINOPHEN 975 MG: 325 TABLET, FILM COATED ORAL at 16:27

## 2025-04-24 ASSESSMENT — ACTIVITIES OF DAILY LIVING (ADL)
ADLS_ACUITY_SCORE: 30
ADLS_ACUITY_SCORE: 30
ADLS_ACUITY_SCORE: 29
ADLS_ACUITY_SCORE: 30
DEPENDENT_IADLS:: INDEPENDENT
ADLS_ACUITY_SCORE: 30
ADLS_ACUITY_SCORE: 29
ADLS_ACUITY_SCORE: 30

## 2025-04-24 NOTE — PLAN OF CARE
"Assumed care @ 1500 after Labetalol 20 IVP given & Mag loading dose started.  Freq VS /assessments done.  's/70-80\"s. BP did start increasing at 1700 :142/78,145/77,153/85 @ 2100.  Called Trae at 1900 & in Surgery. Spoke with Midwife Aliza NICHOLS & will have Trae return call after finished in OR. Dr Larkin returned call at 2000.  Discussed BP readings & Labs:WDL except Hgb 8.5.Pt is on oral Iron supp. BID.  Obtained Orders: Labetalol 30 mg oral daily.  Will assess for Magnesium stop time  & Possible Venofer IV Infusion tomorrow after rounding on pt.  Pt denies S/S of pre-eclampsia;HA, blurred vision or epigastric pain.  Reflexes normal and no clonus.  Lungs clear, IS started at 2200 tonight.   Incision drsg CDI. Incisional pain well controlled with Tylenol, Ibuprofen & abdominal binderDoes c/o gas pains but better after loose BM's tonight.   Bladimir reg diet.  Voided 1000 ml clear yellow urine. Had 2 loose stools so senna held tonight.  Ambulating to bathroom with SBA one & bladimir. Act. Well.     bottle feeding Similac formula per birth plan.   Ayan at bedside,supportive and doing  cares.  Goal Outcome Evaluation:      Plan of Care Reviewed With: patient, spouse    Overall Patient Progress: no changeOverall Patient Progress: no change           "

## 2025-04-24 NOTE — PLAN OF CARE
Goal Outcome Evaluation:      Plan of Care Reviewed With: patient, spouse    Overall Patient Progress: improvingOverall Patient Progress: improving    VSS ex BP high of 155/89. Denies headaches, vision changes or epigastric pain. +2 reflexes and absent clonus. Incision C/D/I with aquacel dressing. Up to bathroom with stand by assist. Voiding without difficulty. Pain controlled with tylenol and ibuprofen. Abdominal binder in use. Magnesium infusing Continue with plan of care and notify provider as needed.

## 2025-04-24 NOTE — PLAN OF CARE
Dr mckinney updated on /74.  Ok to discharge to home.   MD will put in orders    VSS and fundus firm.    Patient states pain adequately managed with PRN pain medications.    Patient discharged to home at 1830. Discharge instructions, self care, and reasons to call doctor reviewed with patient.  Rx OF PROCARDIA E SCRIBED TO [facility].pt has Tylenol, Ibuprofen and iron pills at home.  Patient verbalizes understanding to make follow-up appointment as directed by physician. Needs to call for APPT to follow up on Monday for bp check. Patient states no questions with discharge    Goal Outcome Evaluation:      Plan of Care Reviewed With: patient, spouse    Overall Patient Progress: improvingOverall Patient Progress: improving

## 2025-04-24 NOTE — DISCHARGE INSTRUCTIONS
Warning Signs after Having a Baby    FOLLOW UP OM MONDAY FOR BP CHECK-CALL FOR APPT.    Keep this paper on your fridge or somewhere else where you can see it.    Call your provider if you have any of these symptoms up to 12 weeks after having your baby.    Thoughts of hurting yourself or your baby  Pain in your chest or trouble breathing  Severe headache not helped by pain medicine  Eyesight concerns (blurry vision, seeing spots or flashes of light, other changes to eyesight)  Fainting, shaking or other signs of a seizure    Call 9-1-1 if you feel that it is an emergency.     The symptoms below can happen to anyone after giving birth. They can be very serious. Call your provider if you have any of these warning signs.    My provider s phone number: _______________________    Losing too much blood (hemorrhage)    Call your provider if you soak through a pad in less than an hour or pass blood clots bigger than a golf ball. These may be signs that you are bleeding too much.    Blood clots in the legs or lungs    After you give birth, your body naturally clots its blood to help prevent blood loss. Sometimes this increased clotting can happen in other areas of the body, like the legs or lungs. This can block your blood flow and be very dangerous.     Call your provider if you:  Have a red, swollen spot on the back of your leg that is warm or painful when you touch it.   Are coughing up blood.     Infection    Call your provider if you have any of these symptoms:  Fever of 100.4 F (38 C) or higher.  Pain or redness around your stitches if you had an incision.   Any yellow, white, or green fluid coming from places where you had stitches or surgery.    Mood Problems (postpartum depression)    Many people feel sad or have mood changes after having a baby. But for some people, these mood swings are worse.     Call your provider right away if you feel so anxious or nervous that you can't care for yourself or your  baby.    Preeclampsia (high blood pressure)    Even if you didn't have high blood pressure when you were pregnant, you are at risk for the high blood pressure disease called preeclampsia. This risk can last up to 12 weeks after giving birth.     Call your provider if you have:   Pain on your right side under your rib cage  Sudden swelling in the hands and face    Remember: You know your body. If something doesn't feel right, get medical help.     For informational purposes only. Not to replace the advice of your health care provider. Copyright 2020 [facility]. All rights reserved. Clinically reviewed by Jenna Pryor, RNC-OB, MSN. ESC[employer]ES[employer] 140078 - Rev 02/23.

## 2025-04-24 NOTE — PROVIDER NOTIFICATION
"Dr Larkin updated on labs & BP after labetalol 20 mg IVP & Mag infusion bolus / started @ 1451.  BP initially low 109/70\"s ,now slowly increasing to 145/77 & 153/85 @ 2000  Plan:Dr will enter order for Procardia. Stop time on Mag infusion & need for Venofer will be creased after morning rounds.   Latest Reference Range & Units 04/23/25 14:54   Creatinine 0.51 - 0.95 mg/dL 0.55   GFR Estimate >60 mL/min/1.73m2 >90   ALT 0 - 50 U/L 9   AST 0 - 45 U/L 18   WBC 4.0 - 11.0 10e3/uL 11.0   Hemoglobin 11.7 - 15.7 g/dL 8.5 (L)   Hematocrit 35.0 - 47.0 % 27.0 (L)   Platelet Count 150 - 450 10e3/uL 209   RBC Count 3.80 - 5.20 10e6/uL 3.23 (L)   MCV 78 - 100 fL 84   MCH 26.5 - 33.0 pg 26.3 (L)   MCHC 31.5 - 36.5 g/dL 31.5   RDW 10.0 - 15.0 % 16.3 (H)   (L): Data is abnormally low  (H): Data is abnormally high  "

## 2025-04-24 NOTE — PROVIDER NOTIFICATION
04/24/25 1312   Provider Notification   Provider Name/Title Dr. Mo   Method of Notification Electronic Page  (vocera messaging)   Request Evaluate-Remote   Notification Reason Status Update  (Mag discontinue time)     Updated MD on patients BPs. Ok to discontinue magnesium sulfate at 1400.

## 2025-04-24 NOTE — PLAN OF CARE
Vital signs stable. BPs 120-130/70s. Magnesium sulfate discontinued. Patient declines signs and symptoms of preeclampsia. Normal reflexes and no clonus. Taking Procardia daily. Incision covered with aquacel dressing- plan to remove prior to discharge. Pain controlled with tylenol and ibuprofen. Patient ambulating well. IV SL. Working on bottle feeding . Patient and infant bonding well.  present and supportive. Patient would like discharge this evening if possible. Will continue with current plan of care.     Goal Outcome Evaluation:      Plan of Care Reviewed With: patient, spouse    Overall Patient Progress: improvingOverall Patient Progress: improving

## 2025-04-24 NOTE — PROGRESS NOTES
"OB CS post op note  POD# 3    S:  Patient doing well. Reports pain well controlled, mostly tylenol/motrin. Tolerating regular diet. Voiding w/o difficulty. Minimal lochia. Baby is formula feeding. Denies HA, vision changes, upper abd pain, N/V.   Started on IV labetalol and IV magnesium yesterday for severe BP's.   Started oral nifedipine 30mg XL yesterday      O:  /74 (BP Location: Left arm, Patient Position: Semi-Monet's, Cuff Size: Adult Large)   Pulse 105   Temp 98.5  F (36.9  C) (Oral)   Resp 16   Ht 1.702 m (5' 7\")   Wt 130.3 kg (287 lb 4.8 oz)   LMP 2024   SpO2 98%   Breastfeeding Unknown   BMI 45.00 kg/m      BP range: 125/74 - 155/89     Gen- A&O, NAD  PULM: breathing comfortably on room air  Abd- appropriately-tender, fundus firm at umbilicus  Incision: Dressing clean, dry and intact, Aquacell dressing in place  Ext- non-tender, 2+ edema    Hemoglobin   Date Value Ref Range Status   2025 8.5 (L) 11.7 - 15.7 g/dL Final     A NEG  Rubella Immune  Fasting BS 88    A/P: 30 year old  POD# 3 s/p primary LTCS for suspected fetal macrosomia, GDMA2, uterine fibroid.  Had severely elevated BP's yesterday and now on antihypertensive and magnesium.     Asymptomatic anemia r/t acute blood loss r/t , Formula feeding    1.  Routine post-partum cares.   - Pain meds as ordered   - Diet as tolerated   - Ambulate    - IS as needed   - start ferrous sulfate 325mg BID  2.  S/p severely elevated BP's yesterday, c/w preE with severe features  - started nifedipine 30mg XL, 2nd dose just given this AM  - IV magnesium for seizure prophylaxis x 24 hours, done at 3pm  3. GDMA2  - FBG wnl  - Plan 2hr GTT at 6 weeks postpartum  4. Rh neg, baby Rh positive. S/p rhogam.     Dispo: Possible discharge home this evening if BP's remain stable after magnesium. Has BP cuff at home. Would need BP check Monday in office    Courtney Mo MD        "

## 2025-05-17 ENCOUNTER — HEALTH MAINTENANCE LETTER (OUTPATIENT)
Age: 30
End: 2025-05-17

## 2025-08-14 NOTE — ANESTHESIA POSTPROCEDURE EVALUATION
Patient: Melva Escobar    Procedure: Procedure(s):  Primary  section       Anesthesia Type:  Spinal    Note:  Disposition: Inpatient   Postop Pain Control: Uneventful            Sign Out: Well controlled pain   PONV: No   Neuro/Psych: Uneventful            Sign Out: Acceptable/Baseline neuro status   Airway/Respiratory: Uneventful            Sign Out: Acceptable/Baseline resp. status   CV/Hemodynamics: Uneventful            Sign Out: Acceptable CV status; No obvious hypovolemia; No obvious fluid overload   Other NRE: NONE   DID A NON-ROUTINE EVENT OCCUR? No           Last vitals:  Vitals Value Taken Time   /77 25 16:15   Temp 36.4  C (97.5  F) 25 14:22   Pulse 80 25 16:15   Resp 16 25 16:15   SpO2 100 % 25 16:15       Electronically Signed By: Main Foote MD  2025  9:23 PM

## (undated) DEVICE — SU MONOCRYL 0 CT 36" Y358H

## (undated) DEVICE — SU VICRYL 0 CT-1 36" J346H

## (undated) DEVICE — SOL WATER IRRIG 1000ML BOTTLE 2F7114

## (undated) DEVICE — PREP CHLORAPREP 26ML TINTED HI-LITE ORANGE 930815

## (undated) DEVICE — PACK C-SECTION LF PL15OTA83B

## (undated) DEVICE — CATH TRAY FOLEY SURESTEP 16FR WDRAIN BAG STLK LATEX A300316A

## (undated) DEVICE — BLADE CLIPPER 4406

## (undated) DEVICE — ESU GROUND PAD UNIVERSAL W/O CORD

## (undated) DEVICE — SU VICRYL 2-0 CT-1 27" UND J259H

## (undated) DEVICE — LINEN GOWN OVERSIZE 5408

## (undated) DEVICE — LINEN C-SECTION 5415

## (undated) DEVICE — Device

## (undated) DEVICE — SOL NACL 0.9% IRRIG 1000ML BOTTLE 07138-09

## (undated) DEVICE — BAG DECANTER STERILE WHITE DYNJDEC09

## (undated) RX ORDER — OXYTOCIN/0.9 % SODIUM CHLORIDE 30/500 ML
PLASTIC BAG, INJECTION (ML) INTRAVENOUS
Status: DISPENSED
Start: 2025-04-21

## (undated) RX ORDER — MORPHINE SULFATE 1 MG/ML
INJECTION, SOLUTION EPIDURAL; INTRATHECAL; INTRAVENOUS
Status: DISPENSED
Start: 2025-04-21